# Patient Record
Sex: MALE | Race: WHITE | NOT HISPANIC OR LATINO | Employment: OTHER | URBAN - METROPOLITAN AREA
[De-identification: names, ages, dates, MRNs, and addresses within clinical notes are randomized per-mention and may not be internally consistent; named-entity substitution may affect disease eponyms.]

---

## 2017-09-18 ENCOUNTER — TELEPHONE (OUTPATIENT)
Dept: INTERNAL MEDICINE | Facility: CLINIC | Age: 57
End: 2017-09-18

## 2017-09-18 NOTE — TELEPHONE ENCOUNTER
----- Message from Cuca Dillon sent at 9/18/2017  9:53 AM CDT -----  Contact: self   Patient would like to consult with nurse regarding orders for annual which is scheduled 9/25/17. Please call back at [phone].      Thanks,  Cuca Dillon

## 2017-09-19 DIAGNOSIS — Z00.00 ANNUAL PHYSICAL EXAM: Primary | ICD-10-CM

## 2017-09-20 ENCOUNTER — LAB VISIT (OUTPATIENT)
Dept: LAB | Facility: HOSPITAL | Age: 57
End: 2017-09-20
Attending: FAMILY MEDICINE
Payer: COMMERCIAL

## 2017-09-20 DIAGNOSIS — Z00.00 ANNUAL PHYSICAL EXAM: ICD-10-CM

## 2017-09-20 DIAGNOSIS — Z11.59 NEED FOR HEPATITIS C SCREENING TEST: ICD-10-CM

## 2017-09-20 LAB
ALBUMIN SERPL BCP-MCNC: 4.5 G/DL
ALP SERPL-CCNC: 41 U/L
ALT SERPL W/O P-5'-P-CCNC: 46 U/L
ANION GAP SERPL CALC-SCNC: 11 MMOL/L
AST SERPL-CCNC: 49 U/L
BASOPHILS # BLD AUTO: 0.06 K/UL
BASOPHILS NFR BLD: 1.2 %
BILIRUB SERPL-MCNC: 1.2 MG/DL
BUN SERPL-MCNC: 19 MG/DL
CALCIUM SERPL-MCNC: 10.4 MG/DL
CHLORIDE SERPL-SCNC: 101 MMOL/L
CHOLEST SERPL-MCNC: 233 MG/DL
CHOLEST/HDLC SERPL: 6.1 {RATIO}
CO2 SERPL-SCNC: 30 MMOL/L
COMPLEXED PSA SERPL-MCNC: 1.6 NG/ML
CREAT SERPL-MCNC: 1.6 MG/DL
DIFFERENTIAL METHOD: ABNORMAL
EOSINOPHIL # BLD AUTO: 0.1 K/UL
EOSINOPHIL NFR BLD: 1.8 %
ERYTHROCYTE [DISTWIDTH] IN BLOOD BY AUTOMATED COUNT: 13 %
EST. GFR  (AFRICAN AMERICAN): 54.5 ML/MIN/1.73 M^2
EST. GFR  (NON AFRICAN AMERICAN): 47.1 ML/MIN/1.73 M^2
GLUCOSE SERPL-MCNC: 96 MG/DL
HCT VFR BLD AUTO: 40.8 %
HDLC SERPL-MCNC: 38 MG/DL
HDLC SERPL: 16.3 %
HGB BLD-MCNC: 14.5 G/DL
LDLC SERPL CALC-MCNC: 136.8 MG/DL
LYMPHOCYTES # BLD AUTO: 2 K/UL
LYMPHOCYTES NFR BLD: 39.3 %
MCH RBC QN AUTO: 31.3 PG
MCHC RBC AUTO-ENTMCNC: 35.5 G/DL
MCV RBC AUTO: 88 FL
MONOCYTES # BLD AUTO: 0.5 K/UL
MONOCYTES NFR BLD: 10.2 %
NEUTROPHILS # BLD AUTO: 2.4 K/UL
NEUTROPHILS NFR BLD: 47.3 %
NONHDLC SERPL-MCNC: 195 MG/DL
PLATELET # BLD AUTO: 259 K/UL
PMV BLD AUTO: 12 FL
POTASSIUM SERPL-SCNC: 3.5 MMOL/L
PROT SERPL-MCNC: 7.7 G/DL
RBC # BLD AUTO: 4.64 M/UL
SODIUM SERPL-SCNC: 142 MMOL/L
TRIGL SERPL-MCNC: 291 MG/DL
WBC # BLD AUTO: 5.01 K/UL

## 2017-09-20 PROCEDURE — 80061 LIPID PANEL: CPT

## 2017-09-20 PROCEDURE — 36415 COLL VENOUS BLD VENIPUNCTURE: CPT | Mod: PO

## 2017-09-20 PROCEDURE — 84153 ASSAY OF PSA TOTAL: CPT

## 2017-09-20 PROCEDURE — 80053 COMPREHEN METABOLIC PANEL: CPT

## 2017-09-20 PROCEDURE — 85025 COMPLETE CBC W/AUTO DIFF WBC: CPT

## 2017-09-20 PROCEDURE — 86803 HEPATITIS C AB TEST: CPT

## 2017-09-21 LAB — HCV AB SERPL QL IA: NEGATIVE

## 2017-09-25 ENCOUNTER — OFFICE VISIT (OUTPATIENT)
Dept: INTERNAL MEDICINE | Facility: CLINIC | Age: 57
End: 2017-09-25
Payer: COMMERCIAL

## 2017-09-25 VITALS
WEIGHT: 214.5 LBS | SYSTOLIC BLOOD PRESSURE: 124 MMHG | DIASTOLIC BLOOD PRESSURE: 76 MMHG | HEIGHT: 71 IN | BODY MASS INDEX: 30.03 KG/M2 | HEART RATE: 82 BPM | TEMPERATURE: 96 F

## 2017-09-25 DIAGNOSIS — Z12.11 ENCOUNTER FOR SCREENING COLONOSCOPY: ICD-10-CM

## 2017-09-25 DIAGNOSIS — I10 ESSENTIAL HYPERTENSION: Primary | ICD-10-CM

## 2017-09-25 DIAGNOSIS — E78.5 HYPERLIPIDEMIA, UNSPECIFIED HYPERLIPIDEMIA TYPE: ICD-10-CM

## 2017-09-25 DIAGNOSIS — Z00.00 ANNUAL PHYSICAL EXAM: ICD-10-CM

## 2017-09-25 PROCEDURE — 3074F SYST BP LT 130 MM HG: CPT | Mod: S$GLB,,, | Performed by: FAMILY MEDICINE

## 2017-09-25 PROCEDURE — 99396 PREV VISIT EST AGE 40-64: CPT | Mod: S$GLB,,, | Performed by: FAMILY MEDICINE

## 2017-09-25 PROCEDURE — 3078F DIAST BP <80 MM HG: CPT | Mod: S$GLB,,, | Performed by: FAMILY MEDICINE

## 2017-09-25 PROCEDURE — 99999 PR PBB SHADOW E&M-EST. PATIENT-LVL III: CPT | Mod: PBBFAC,,, | Performed by: FAMILY MEDICINE

## 2017-09-25 PROCEDURE — 3008F BODY MASS INDEX DOCD: CPT | Mod: S$GLB,,, | Performed by: FAMILY MEDICINE

## 2017-09-25 RX ORDER — OMEPRAZOLE 20 MG/1
20 CAPSULE, DELAYED RELEASE ORAL DAILY
COMMUNITY
End: 2024-03-12 | Stop reason: SDUPTHER

## 2017-09-25 RX ORDER — AMLODIPINE, VALSARTAN AND HYDROCHLOROTHIAZIDE 10; 320; 25 MG/1; MG/1; MG/1
1 TABLET ORAL DAILY
Qty: 90 TABLET | Refills: 3 | Status: SHIPPED | OUTPATIENT
Start: 2017-09-25 | End: 2018-11-15 | Stop reason: SDUPTHER

## 2017-09-25 RX ORDER — FENOFIBRATE 54 MG/1
54 TABLET ORAL DAILY
Qty: 90 TABLET | Refills: 3 | Status: SHIPPED | OUTPATIENT
Start: 2017-09-25 | End: 2018-11-15 | Stop reason: SDUPTHER

## 2017-09-25 NOTE — PROGRESS NOTES
Subjective:       Patient ID: Darek Leal is a 57 y.o. male.    Chief Complaint: Follow-up    F/U:      Pt is a 57 year old who is here for follow-up. Pt BP is well controlled and review of his labs. Pt cholesterol is elevated from last labs.       Review of Systems   Constitutional: Negative.    Respiratory: Negative.    Cardiovascular: Negative.    Gastrointestinal: Negative.    Genitourinary: Negative.    Musculoskeletal: Negative.    Neurological: Negative.    Hematological: Negative.    Psychiatric/Behavioral: Negative.        Objective:      Physical Exam   Constitutional: He is oriented to person, place, and time. He appears well-developed and well-nourished.   Cardiovascular: Normal rate and regular rhythm.    Pulmonary/Chest: Effort normal and breath sounds normal.   Abdominal: Soft. Bowel sounds are normal.   Neurological: He is alert and oriented to person, place, and time.       Assessment:       1. Essential hypertension    2. Hyperlipidemia, unspecified hyperlipidemia type    3. Encounter for screening colonoscopy        Plan:       Essential hypertension  Comments:  BP is stable and controlled  Orders:  -     Basic metabolic panel; Future; Expected date: 11/08/2017    Hyperlipidemia, unspecified hyperlipidemia type  Comments:  Will recheck in 4 months  Orders:  -     Lipid panel; Future; Expected date: 02/05/2018    Encounter for screening colonoscopy  Comments:  Will schedule for colonsocopy  Orders:  -     Case request GI: COLONOSCOPY    Other orders  -     amlodipine-valsartan-hcthiazid (EXFORGE HCT) -25 mg Tab; Take 1 tablet by mouth once daily.  Dispense: 90 tablet; Refill: 3  -     fenofibrate (TRICOR) 54 MG tablet; Take 1 tablet (54 mg total) by mouth once daily.  Dispense: 90 tablet; Refill: 3

## 2017-10-18 RX ORDER — SODIUM, POTASSIUM,MAG SULFATES 17.5-3.13G
SOLUTION, RECONSTITUTED, ORAL ORAL
Qty: 354 ML | Refills: 0 | Status: ON HOLD | OUTPATIENT
Start: 2017-10-18 | End: 2017-11-07 | Stop reason: HOSPADM

## 2017-11-07 ENCOUNTER — ANESTHESIA (OUTPATIENT)
Dept: ENDOSCOPY | Facility: HOSPITAL | Age: 57
End: 2017-11-07
Payer: COMMERCIAL

## 2017-11-07 ENCOUNTER — HOSPITAL ENCOUNTER (OUTPATIENT)
Facility: HOSPITAL | Age: 57
Discharge: HOME | End: 2017-11-07
Attending: FAMILY MEDICINE | Admitting: FAMILY MEDICINE
Payer: COMMERCIAL

## 2017-11-07 ENCOUNTER — SURGERY (OUTPATIENT)
Age: 57
End: 2017-11-07

## 2017-11-07 ENCOUNTER — ANESTHESIA EVENT (OUTPATIENT)
Dept: ENDOSCOPY | Facility: HOSPITAL | Age: 57
End: 2017-11-07
Payer: COMMERCIAL

## 2017-11-07 VITALS
TEMPERATURE: 98 F | WEIGHT: 211 LBS | HEIGHT: 72 IN | DIASTOLIC BLOOD PRESSURE: 89 MMHG | SYSTOLIC BLOOD PRESSURE: 146 MMHG | HEART RATE: 62 BPM | RESPIRATION RATE: 18 BRPM | BODY MASS INDEX: 28.58 KG/M2 | OXYGEN SATURATION: 96 %

## 2017-11-07 VITALS — RESPIRATION RATE: 15 BRPM

## 2017-11-07 DIAGNOSIS — K63.5 POLYP OF COLON, UNSPECIFIED PART OF COLON, UNSPECIFIED TYPE: ICD-10-CM

## 2017-11-07 DIAGNOSIS — Z12.11 ENCOUNTER FOR SCREENING COLONOSCOPY: Primary | ICD-10-CM

## 2017-11-07 DIAGNOSIS — Z12.11 SPECIAL SCREENING FOR MALIGNANT NEOPLASMS, COLON: ICD-10-CM

## 2017-11-07 PROCEDURE — 25000003 PHARM REV CODE 250: Performed by: FAMILY MEDICINE

## 2017-11-07 PROCEDURE — 27201012 HC FORCEPS, HOT/COLD, DISP: Performed by: FAMILY MEDICINE

## 2017-11-07 PROCEDURE — 45385 COLONOSCOPY W/LESION REMOVAL: CPT | Performed by: FAMILY MEDICINE

## 2017-11-07 PROCEDURE — 45380 COLONOSCOPY AND BIOPSY: CPT | Mod: 59,,, | Performed by: FAMILY MEDICINE

## 2017-11-07 PROCEDURE — 88305 TISSUE EXAM BY PATHOLOGIST: CPT | Mod: 26,,, | Performed by: PATHOLOGY

## 2017-11-07 PROCEDURE — 27200997: Performed by: FAMILY MEDICINE

## 2017-11-07 PROCEDURE — 27201089 HC SNARE, DISP (ANY): Performed by: FAMILY MEDICINE

## 2017-11-07 PROCEDURE — 25000003 PHARM REV CODE 250: Performed by: NURSE ANESTHETIST, CERTIFIED REGISTERED

## 2017-11-07 PROCEDURE — 37000008 HC ANESTHESIA 1ST 15 MINUTES: Performed by: FAMILY MEDICINE

## 2017-11-07 PROCEDURE — 88305 TISSUE EXAM BY PATHOLOGIST: CPT | Performed by: PATHOLOGY

## 2017-11-07 PROCEDURE — 63600175 PHARM REV CODE 636 W HCPCS: Performed by: NURSE ANESTHETIST, CERTIFIED REGISTERED

## 2017-11-07 PROCEDURE — 37000009 HC ANESTHESIA EA ADD 15 MINS: Performed by: FAMILY MEDICINE

## 2017-11-07 PROCEDURE — 45385 COLONOSCOPY W/LESION REMOVAL: CPT | Mod: 33,,, | Performed by: FAMILY MEDICINE

## 2017-11-07 PROCEDURE — 45380 COLONOSCOPY AND BIOPSY: CPT | Performed by: FAMILY MEDICINE

## 2017-11-07 RX ORDER — LIDOCAINE HYDROCHLORIDE 10 MG/ML
INJECTION INFILTRATION; PERINEURAL
Status: DISCONTINUED | OUTPATIENT
Start: 2017-11-07 | End: 2017-11-07

## 2017-11-07 RX ORDER — PROPOFOL 10 MG/ML
VIAL (ML) INTRAVENOUS
Status: DISCONTINUED | OUTPATIENT
Start: 2017-11-07 | End: 2017-11-07

## 2017-11-07 RX ORDER — SODIUM CHLORIDE, SODIUM LACTATE, POTASSIUM CHLORIDE, CALCIUM CHLORIDE 600; 310; 30; 20 MG/100ML; MG/100ML; MG/100ML; MG/100ML
INJECTION, SOLUTION INTRAVENOUS CONTINUOUS
Status: DISCONTINUED | OUTPATIENT
Start: 2017-11-08 | End: 2017-11-07 | Stop reason: HOSPADM

## 2017-11-07 RX ADMIN — PROPOFOL 70 MG: 10 INJECTION, EMULSION INTRAVENOUS at 09:11

## 2017-11-07 RX ADMIN — PROPOFOL 30 MG: 10 INJECTION, EMULSION INTRAVENOUS at 09:11

## 2017-11-07 RX ADMIN — PROPOFOL 20 MG: 10 INJECTION, EMULSION INTRAVENOUS at 09:11

## 2017-11-07 RX ADMIN — PROPOFOL 50 MG: 10 INJECTION, EMULSION INTRAVENOUS at 09:11

## 2017-11-07 RX ADMIN — LIDOCAINE HYDROCHLORIDE 50 MG: 10 INJECTION, SOLUTION INFILTRATION; PERINEURAL at 09:11

## 2017-11-07 RX ADMIN — SODIUM CHLORIDE, SODIUM LACTATE, POTASSIUM CHLORIDE, AND CALCIUM CHLORIDE: .6; .31; .03; .02 INJECTION, SOLUTION INTRAVENOUS at 09:11

## 2017-11-07 NOTE — ANESTHESIA PREPROCEDURE EVALUATION
11/07/2017  Darek Leal is a 57 y.o., male.    Anesthesia Evaluation    I have reviewed the Patient Summary Reports.    I have reviewed the Nursing Notes.   I have reviewed the Medications.     Review of Systems  Anesthesia Hx:  No problems with previous Anesthesia  Denies Family Hx of Anesthesia complications.   Denies Personal Hx of Anesthesia complications.   Social:  No Alcohol Use, Non-Smoker    Hematology/Oncology:  Hematology Normal   Oncology Normal     Cardiovascular:   Hypertension Denies MI.   Denies CABG/stent.      hyperlipidemia    Pulmonary:   Denies COPD.  Denies Asthma.  Denies Sleep Apnea.    Renal/:  Renal/ Normal     Hepatic/GI:   Bowel Prep. GERD Denies Liver Disease. Denies Hepatitis.    Musculoskeletal:   Gout   Neurological:   Denies CVA. Denies Seizures.    Endocrine:  Endocrine Normal        Physical Exam  General:  Obesity    Airway/Jaw/Neck:  Airway Findings: Mouth Opening: Normal Tongue: Normal  General Airway Assessment: Adult  Mallampati: II      Dental:  Dental Findings: In tact   Chest/Lungs:  Chest/Lungs Findings: Clear to auscultation, Normal Respiratory Rate     Heart/Vascular:  Heart Findings: Rate: Normal  Rhythm: Regular Rhythm  Sounds: Normal             Anesthesia Plan  Type of Anesthesia, risks & benefits discussed:  Anesthesia Type:  MAC  Patient's Preference:   Intra-op Monitoring Plan: standard ASA monitors  Intra-op Monitoring Plan Comments:   Post Op Pain Control Plan:   Post Op Pain Control Plan Comments:   Induction:   IV  Beta Blocker:  Patient is not currently on a Beta-Blocker (No further documentation required).       Informed Consent: Patient understands risks and agrees with Anesthesia plan.  Questions answered. Anesthesia consent signed with patient.  ASA Score: 2     Day of Surgery Review of History & Physical: I have interviewed and examined  the patient. I have reviewed the patient's H&P dated:  There are no significant changes.  H&P update referred to the surgeon.         Ready For Surgery From Anesthesia Perspective.

## 2017-11-07 NOTE — H&P
Short Stay Endoscopy History and Physical    PCP - Td Dunn MD    Procedure - Colonoscopy  ASA - 2  Mallampati - per anesthesia  History of Anesthesia problems - no  Family history Anesthesia problems -  no     HPI:  This is a 57 y.o. male here for evaluation of :   Active Hospital Problems    Diagnosis  POA    Special screening for malignant neoplasms, colon [Z12.11]  Not Applicable      Resolved Hospital Problems    Diagnosis Date Resolved POA   No resolved problems to display.         Health Maintenance       Date Due Completion Date    TETANUS VACCINE 01/01/1978 ---    Colonoscopy 01/01/2010 ---    Influenza Vaccine 08/01/2017 ---    PROSTATE-SPECIFIC ANTIGEN 09/20/2018 9/20/2017    Override on 1/11/2013: Done    Lipid Panel 09/20/2018 9/20/2017          Screening - yes-this is his first.  History of polyps - no  Diarrhea - no  Anemia - no  Blood in stools - no  Abdominal pain - no  Other - no    ROS:  CONSTITUTIONAL: Denies weight change,  fatigue, fevers, chills, night sweats.  CARDIOVASCULAR: Denies chest pain, shortness of breath, orthopnea and edema.  RESPIRATORY: Denies cough, hemoptysis, dyspnea, and wheezing.  GI: See HPI.    Medical History:   Past Medical History:   Diagnosis Date    Gout attack     Hypertension        Surgical History:   Past Surgical History:   Procedure Laterality Date    ANKLE SURGERY      GANGLION CYST EXCISION      VASECTOMY         Family History:   Family History   Problem Relation Age of Onset    Hypertension Mother        Social History:   Social History   Substance Use Topics    Smoking status: Never Smoker    Smokeless tobacco: Never Used    Alcohol use No       Allergies:   Review of patient's allergies indicates:  No Known Allergies    Medications:   No current facility-administered medications on file prior to encounter.      Current Outpatient Prescriptions on File Prior to Encounter   Medication Sig Dispense Refill    amlodipine-valsartan-hcthiazid  (EXFORGE HCT) -25 mg Tab Take 1 tablet by mouth once daily. 90 tablet 3    fenofibrate (TRICOR) 54 MG tablet Take 1 tablet (54 mg total) by mouth once daily. 90 tablet 3    omeprazole (PRILOSEC) 20 MG capsule Take 20 mg by mouth once daily.      sodium,potassium,&mag sulfates (SUPREP) 17.5-3.13-1.6 gram SolR Use as directed. 354 mL 0       Physical Exam:  Vital Signs: see nurses notes.  General Appearance: Well appearing in no acute distress  ENT: OP clear  Chest: CTA B  CV: RRR, no m/r/g  Abd: s/nt/nd/nabs  Ext: no edema    Labs:Reviewed    IMP:  Active Hospital Problems    Diagnosis  POA    Special screening for malignant neoplasms, colon [Z12.11]  Not Applicable      Resolved Hospital Problems    Diagnosis Date Resolved POA   No resolved problems to display.         Plan:   I have explained the risks and benefits of colonoscopy to the patient including but not limited to bleeding, perforation, infection, and death. The patient wishes to proceed.

## 2017-11-07 NOTE — ANESTHESIA RELEASE NOTE
"Anesthesia Release from PACU Note    Patient: Darek Leal    Procedure(s) Performed: Procedure(s) (LRB):  COLONOSCOPY (N/A)    Anesthesia type: MAC    Post pain: Adequate analgesia    Post assessment: no apparent anesthetic complications, tolerated procedure well and no evidence of recall    Last Vitals:   Visit Vitals  BP (!) 169/97 (BP Location: Left arm, Patient Position: Lying)   Pulse 85   Temp 36.7 °C (98.1 °F) (Oral)   Resp 18   Ht 5' 11.5" (1.816 m)   Wt 95.7 kg (211 lb)   SpO2 97%   BMI 29.02 kg/m²       Post vital signs: stable    Level of consciousness: awake    Nausea/Vomiting: no nausea/no vomiting    Complications: none    Airway Patency: patent    Respiratory: unassisted, spontaneous ventilation, room air    Cardiovascular: stable and blood pressure at baseline    Hydration: euvolemic  "

## 2017-11-07 NOTE — ANESTHESIA POSTPROCEDURE EVALUATION
"Anesthesia Post Evaluation    Patient: Darek Leal    Procedure(s) Performed: Procedure(s) (LRB):  COLONOSCOPY (N/A)    Final Anesthesia Type: MAC  Patient location during evaluation: PACU  Patient participation: Yes- Able to Participate  Level of consciousness: awake  Post-procedure vital signs: reviewed and stable  Pain management: adequate  Airway patency: patent  PONV status at discharge: No PONV  Anesthetic complications: no      Cardiovascular status: blood pressure returned to baseline and hemodynamically stable  Respiratory status: unassisted, spontaneous ventilation and room air  Hydration status: euvolemic  Follow-up not needed.        Visit Vitals  BP (!) 169/97 (BP Location: Left arm, Patient Position: Lying)   Pulse 85   Temp 36.7 °C (98.1 °F) (Oral)   Resp 18   Ht 5' 11.5" (1.816 m)   Wt 95.7 kg (211 lb)   SpO2 97%   BMI 29.02 kg/m²       Pain/Demetrius Score: Pain Assessment Performed: Yes (11/7/2017  8:56 AM)  Presence of Pain: denies (11/7/2017  8:56 AM)      "

## 2017-11-07 NOTE — TRANSFER OF CARE
"Anesthesia Transfer of Care Note    Patient: Darek Leal    Procedure(s) Performed: Procedure(s) (LRB):  COLONOSCOPY (N/A)    Patient location: PACU    Anesthesia Type: MAC    Transport from OR: Transported from OR on room air with adequate spontaneous ventilation    Post pain: adequate analgesia    Post assessment: no apparent anesthetic complications and tolerated procedure well    Post vital signs: stable    Level of consciousness: awake    Nausea/Vomiting: no nausea/vomiting    Complications: none    Transfer of care protocol was followed      Last vitals:   Visit Vitals  BP (!) 169/97 (BP Location: Left arm, Patient Position: Lying)   Pulse 85   Temp 36.7 °C (98.1 °F) (Oral)   Resp 18   Ht 5' 11.5" (1.816 m)   Wt 95.7 kg (211 lb)   SpO2 97%   BMI 29.02 kg/m²     "

## 2017-11-07 NOTE — DISCHARGE INSTRUCTIONS

## 2017-11-07 NOTE — DISCHARGE SUMMARY
Endoscopy Discharge Summary      Admit Date: 11/7/2017    Discharge Date and Time:  11/7/2017 9:56 AM    Attending Physician: Philipp Chen MD     Discharge Physician: Philipp Chen MD     Principal Admitting Diagnoses: Special screening for malignant neoplasms, colon         Discharge Diagnosis: The primary encounter diagnosis was Encounter for screening colonoscopy. Diagnoses of Special screening for malignant neoplasms, colon and Polyp of colon, unspecified part of colon, unspecified type were also pertinent to this visit.     Discharged Condition: Good    Indication for Admission: Special screening for malignant neoplasms, colon     Hospital Course: Patient was admitted for an inpatient procedure and tolerated the procedure well with no complications.    Significant Diagnostic Studies: hemoclip placement x 2, Colonoscopy with cold biopsy polypectomy and Colonoscopy with hot snare polypectomy    Pathology (if any):  Specimen (12h ago through future)    Start     Ordered    11/07/17 0945  Specimen to Pathology - Surgery  Once     Comments:  1.  Transverse colon polyp2.  Distal descending colon polyp3.  Sigmiod colon polyp      11/07/17 0951          Estimated Blood Loss: 0 ml.    Discussed with: patient and family.    Disposition: Home.    Follow Up/Patient Instructions:   Current Discharge Medication List      CONTINUE these medications which have NOT CHANGED    Details   amlodipine-valsartan-hcthiazid (EXFORGE HCT) -25 mg Tab Take 1 tablet by mouth once daily.  Qty: 90 tablet, Refills: 3      fenofibrate (TRICOR) 54 MG tablet Take 1 tablet (54 mg total) by mouth once daily.  Qty: 90 tablet, Refills: 3      omeprazole (PRILOSEC) 20 MG capsule Take 20 mg by mouth once daily.         STOP taking these medications       sodium,potassium,&mag sulfates (SUPREP) 17.5-3.13-1.6 gram SolR Comments:   Reason for Stopping:         sodium,potassium,mag sulfates (SUPREP BOWEL PREP KIT) 17.5-3.13-1.6 gram SolR  Comments:   Reason for Stopping:                 Discharge Procedure Orders  Diet general     Activity as tolerated     Call MD for:  temperature >100.4     Call MD for:  persistent nausea and vomiting     Call MD for:  severe uncontrolled pain     Call MD for:  difficulty breathing, headache or visual disturbances     Call MD for:  redness, tenderness, or signs of infection (pain, swelling, redness, odor or green/yellow discharge around incision site)     Call MD for:  hives     Call MD for:  persistent dizziness or light-headedness     No dressing needed         Follow-up Information     Philipp Chen MD. Call in 1 week.    Specialty:  Family Medicine  Why:  To receive pathology results.  Contact information:  [address] [facility] [address] [phone]                   @[email or web address]([phone])@

## 2017-11-08 ENCOUNTER — LAB VISIT (OUTPATIENT)
Dept: LAB | Facility: HOSPITAL | Age: 57
End: 2017-11-08
Attending: FAMILY MEDICINE
Payer: COMMERCIAL

## 2017-11-08 DIAGNOSIS — I10 ESSENTIAL HYPERTENSION: ICD-10-CM

## 2017-11-08 DIAGNOSIS — E78.5 HYPERLIPIDEMIA, UNSPECIFIED HYPERLIPIDEMIA TYPE: ICD-10-CM

## 2017-11-08 LAB
ANION GAP SERPL CALC-SCNC: 10 MMOL/L
BUN SERPL-MCNC: 21 MG/DL
CALCIUM SERPL-MCNC: 9.9 MG/DL
CHLORIDE SERPL-SCNC: 104 MMOL/L
CHOLEST SERPL-MCNC: 225 MG/DL
CHOLEST/HDLC SERPL: 6.1 {RATIO}
CO2 SERPL-SCNC: 28 MMOL/L
CREAT SERPL-MCNC: 1.6 MG/DL
EST. GFR  (AFRICAN AMERICAN): 54.5 ML/MIN/1.73 M^2
EST. GFR  (NON AFRICAN AMERICAN): 47.1 ML/MIN/1.73 M^2
GLUCOSE SERPL-MCNC: 86 MG/DL
HDLC SERPL-MCNC: 37 MG/DL
HDLC SERPL: 16.4 %
LDLC SERPL CALC-MCNC: 124.2 MG/DL
NONHDLC SERPL-MCNC: 188 MG/DL
POTASSIUM SERPL-SCNC: 3.9 MMOL/L
SODIUM SERPL-SCNC: 142 MMOL/L
TRIGL SERPL-MCNC: 319 MG/DL

## 2017-11-08 PROCEDURE — 80048 BASIC METABOLIC PNL TOTAL CA: CPT

## 2017-11-08 PROCEDURE — 36415 COLL VENOUS BLD VENIPUNCTURE: CPT | Mod: PO

## 2017-11-08 PROCEDURE — 80061 LIPID PANEL: CPT

## 2017-11-11 NOTE — PROGRESS NOTES
Dear Td Dunn MD,    I recently cared for Darek Leal and performed an endoscopy.  Tissue was sent for pathology evaluation and I will have a letter written to ask the patient to repeat the colonoscopy in 5 years.  The pathology showed that there was adenomatous tissue present.  Thank you for allowing me to participate in the care of your patient.  Please call me for any questions that you might have.      Dr. Philipp Chen  [phone] cell  [phone] office      NURSING STAFF:Please  inform the patient that I reviewed the recent pathology obtained at the time of colonoscopy.    The results showed that there was adenomatous tissue present which is benign and based on that, I recommend that the patient have a repeat colonoscopy performed in 5 years.     If the patient has MyChart, this message has been sent to them.  Confirm that they read the note.  If not, copy the information and print a letter to send to the patient at this time.  Confirm that a notation to the PCP was done.      Dear Darek Leal,    This is to inform you that I have reviewed your recent colonoscopy pathology.  The results showed that you had adenomatous tissue present which is benign and based on that, I recommend that you have a repeat colonoscopy performed in 5 years.      Dr. Philipp Chen  [phone]

## 2017-11-22 ENCOUNTER — TELEPHONE (OUTPATIENT)
Dept: INTERNAL MEDICINE | Facility: CLINIC | Age: 57
End: 2017-11-22

## 2017-11-22 NOTE — TELEPHONE ENCOUNTER
----- Message from Eladia Marie sent at 11/22/2017 11:46 AM CST -----  Contact: Pt/Wife  Please give pt wife a call at [phone] regarding an appt that was documented incorrectly.

## 2017-11-22 NOTE — TELEPHONE ENCOUNTER
Patient's appointment for 09/25 should be annual visit.  Please change or update record and resubmit, per [employer]

## 2017-12-18 ENCOUNTER — OFFICE VISIT (OUTPATIENT)
Dept: INTERNAL MEDICINE | Facility: CLINIC | Age: 57
End: 2017-12-18
Payer: COMMERCIAL

## 2017-12-18 VITALS
HEART RATE: 92 BPM | WEIGHT: 217.81 LBS | DIASTOLIC BLOOD PRESSURE: 89 MMHG | TEMPERATURE: 97 F | SYSTOLIC BLOOD PRESSURE: 138 MMHG | HEIGHT: 72 IN | BODY MASS INDEX: 29.5 KG/M2

## 2017-12-18 DIAGNOSIS — R05.9 COUGH: ICD-10-CM

## 2017-12-18 DIAGNOSIS — J32.0 MAXILLARY SINUSITIS, UNSPECIFIED CHRONICITY: Primary | ICD-10-CM

## 2017-12-18 PROCEDURE — 99999 PR PBB SHADOW E&M-EST. PATIENT-LVL III: CPT | Mod: PBBFAC,,, | Performed by: FAMILY MEDICINE

## 2017-12-18 PROCEDURE — 96372 THER/PROPH/DIAG INJ SC/IM: CPT | Mod: S$GLB,,, | Performed by: FAMILY MEDICINE

## 2017-12-18 PROCEDURE — 99214 OFFICE O/P EST MOD 30 MIN: CPT | Mod: 25,S$GLB,, | Performed by: FAMILY MEDICINE

## 2017-12-18 RX ORDER — TRIAMCINOLONE ACETONIDE 40 MG/ML
40 INJECTION, SUSPENSION INTRA-ARTICULAR; INTRAMUSCULAR
Status: COMPLETED | OUTPATIENT
Start: 2017-12-18 | End: 2017-12-18

## 2017-12-18 RX ORDER — AMOXICILLIN AND CLAVULANATE POTASSIUM 500; 125 MG/1; MG/1
1 TABLET, FILM COATED ORAL 2 TIMES DAILY
Qty: 20 TABLET | Refills: 0 | Status: SHIPPED | OUTPATIENT
Start: 2017-12-18 | End: 2018-11-15

## 2017-12-18 RX ORDER — PROMETHAZINE HYDROCHLORIDE AND DEXTROMETHORPHAN HYDROBROMIDE 6.25; 15 MG/5ML; MG/5ML
5 SYRUP ORAL 2 TIMES DAILY PRN
Qty: 100 ML | Refills: 0 | Status: SHIPPED | OUTPATIENT
Start: 2017-12-18 | End: 2017-12-28

## 2017-12-18 RX ADMIN — TRIAMCINOLONE ACETONIDE 40 MG: 40 INJECTION, SUSPENSION INTRA-ARTICULAR; INTRAMUSCULAR at 11:12

## 2017-12-18 NOTE — PROGRESS NOTES
Subjective:       Patient ID: Darek Leal is a 57 y.o. male.    Chief Complaint: Sinus Problem and Cough    Pt is a 57 year old who has been for about 1 days sinus congestion with cough worse at night      Sinus Problem   This is a new problem. The current episode started yesterday. The problem is unchanged. There has been no fever. Associated symptoms include coughing and sinus pressure. Pertinent negatives include no chills, hoarse voice, shortness of breath or sneezing. Past treatments include nothing. The treatment provided mild relief.   Cough   This is a new problem. The current episode started today. The problem has been unchanged. The cough is productive of sputum. Associated symptoms include nasal congestion and postnasal drip. Pertinent negatives include no chills or shortness of breath. Nothing aggravates the symptoms. He has tried nothing for the symptoms. There is no history of asthma, COPD, emphysema or pneumonia.     Review of Systems   Constitutional: Negative for chills.   HENT: Positive for postnasal drip, sinus pain and sinus pressure. Negative for hoarse voice and sneezing.    Respiratory: Positive for cough. Negative for shortness of breath.    Hematological: Negative.    Psychiatric/Behavioral: Negative.        Objective:      Physical Exam   Constitutional: He is oriented to person, place, and time. He appears well-developed and well-nourished.   HENT:   Right Ear: Tympanic membrane is erythematous. A middle ear effusion is present.   Left Ear: Tympanic membrane is erythematous. A middle ear effusion is present.   Nose: Mucosal edema and rhinorrhea present. Right sinus exhibits maxillary sinus tenderness. Left sinus exhibits maxillary sinus tenderness.   Mouth/Throat: Posterior oropharyngeal erythema present. No tonsillar exudate.   Cardiovascular: Normal rate and regular rhythm.  Exam reveals no gallop.    No murmur heard.  Pulmonary/Chest: Effort normal and breath sounds normal. No  respiratory distress. He has no wheezes.   Neurological: He is alert and oriented to person, place, and time.       Assessment:       1. Maxillary sinusitis, unspecified chronicity    2. Cough        Plan:       Maxillary sinusitis, unspecified chronicity  Comments:  Will do kenalog 40 mg IM and Augmentin if not better in 4 days    Cough  Comments:  phenergan DM    Other orders  -     triamcinolone acetonide injection 40 mg; Inject 1 mL (40 mg total) into the muscle one time.  -     promethazine-dextromethorphan (PROMETHAZINE-DM) 6.25-15 mg/5 mL Syrp; Take 5 mLs by mouth 2 (two) times daily as needed.  Dispense: 100 mL; Refill: 0  -     amoxicillin-clavulanate 500-125mg (AUGMENTIN) 500-125 mg Tab; Take 1 tablet (500 mg total) by mouth 2 (two) times daily.  Dispense: 20 tablet; Refill: 0

## 2018-11-15 ENCOUNTER — LAB VISIT (OUTPATIENT)
Dept: LAB | Facility: HOSPITAL | Age: 58
End: 2018-11-15
Attending: FAMILY MEDICINE
Payer: COMMERCIAL

## 2018-11-15 ENCOUNTER — OFFICE VISIT (OUTPATIENT)
Dept: INTERNAL MEDICINE | Facility: CLINIC | Age: 58
End: 2018-11-15
Payer: COMMERCIAL

## 2018-11-15 VITALS
HEART RATE: 99 BPM | BODY MASS INDEX: 28.58 KG/M2 | DIASTOLIC BLOOD PRESSURE: 100 MMHG | HEIGHT: 72 IN | WEIGHT: 211 LBS | TEMPERATURE: 97 F | SYSTOLIC BLOOD PRESSURE: 142 MMHG

## 2018-11-15 DIAGNOSIS — Z00.00 ANNUAL PHYSICAL EXAM: Primary | ICD-10-CM

## 2018-11-15 DIAGNOSIS — M10.9 GOUT, UNSPECIFIED CAUSE, UNSPECIFIED CHRONICITY, UNSPECIFIED SITE: ICD-10-CM

## 2018-11-15 DIAGNOSIS — Z00.00 ANNUAL PHYSICAL EXAM: ICD-10-CM

## 2018-11-15 LAB
ALBUMIN SERPL BCP-MCNC: 4.2 G/DL
ALP SERPL-CCNC: 43 U/L
ALT SERPL W/O P-5'-P-CCNC: 26 U/L
ANION GAP SERPL CALC-SCNC: 8 MMOL/L
AST SERPL-CCNC: 28 U/L
BASOPHILS # BLD AUTO: 0.07 K/UL
BASOPHILS NFR BLD: 1 %
BILIRUB SERPL-MCNC: 0.7 MG/DL
BUN SERPL-MCNC: 16 MG/DL
CALCIUM SERPL-MCNC: 9.9 MG/DL
CHLORIDE SERPL-SCNC: 103 MMOL/L
CHOLEST SERPL-MCNC: 223 MG/DL
CHOLEST/HDLC SERPL: 5.4 {RATIO}
CO2 SERPL-SCNC: 31 MMOL/L
COMPLEXED PSA SERPL-MCNC: 1.1 NG/ML
CREAT SERPL-MCNC: 1.2 MG/DL
DIFFERENTIAL METHOD: NORMAL
EOSINOPHIL # BLD AUTO: 0.1 K/UL
EOSINOPHIL NFR BLD: 1 %
ERYTHROCYTE [DISTWIDTH] IN BLOOD BY AUTOMATED COUNT: 12.4 %
EST. GFR  (AFRICAN AMERICAN): >60 ML/MIN/1.73 M^2
EST. GFR  (NON AFRICAN AMERICAN): >60 ML/MIN/1.73 M^2
GLUCOSE SERPL-MCNC: 96 MG/DL
HCT VFR BLD AUTO: 40.2 %
HDLC SERPL-MCNC: 41 MG/DL
HDLC SERPL: 18.4 %
HGB BLD-MCNC: 14.2 G/DL
IMM GRANULOCYTES # BLD AUTO: 0.03 K/UL
IMM GRANULOCYTES NFR BLD AUTO: 0.4 %
LDLC SERPL CALC-MCNC: 125.2 MG/DL
LYMPHOCYTES # BLD AUTO: 1.9 K/UL
LYMPHOCYTES NFR BLD: 28.2 %
MCH RBC QN AUTO: 30.9 PG
MCHC RBC AUTO-ENTMCNC: 35.3 G/DL
MCV RBC AUTO: 87 FL
MONOCYTES # BLD AUTO: 0.6 K/UL
MONOCYTES NFR BLD: 8.9 %
NEUTROPHILS # BLD AUTO: 4.1 K/UL
NEUTROPHILS NFR BLD: 60.5 %
NONHDLC SERPL-MCNC: 182 MG/DL
NRBC BLD-RTO: 0 /100 WBC
PLATELET # BLD AUTO: 266 K/UL
PMV BLD AUTO: 12.8 FL
POTASSIUM SERPL-SCNC: 3.8 MMOL/L
PROT SERPL-MCNC: 7.3 G/DL
RBC # BLD AUTO: 4.6 M/UL
SODIUM SERPL-SCNC: 142 MMOL/L
TRIGL SERPL-MCNC: 284 MG/DL
URATE SERPL-MCNC: 6 MG/DL
WBC # BLD AUTO: 6.84 K/UL

## 2018-11-15 PROCEDURE — 80061 LIPID PANEL: CPT

## 2018-11-15 PROCEDURE — 99999 PR PBB SHADOW E&M-EST. PATIENT-LVL III: CPT | Mod: PBBFAC,,, | Performed by: FAMILY MEDICINE

## 2018-11-15 PROCEDURE — 80053 COMPREHEN METABOLIC PANEL: CPT

## 2018-11-15 PROCEDURE — 85025 COMPLETE CBC W/AUTO DIFF WBC: CPT

## 2018-11-15 PROCEDURE — 36415 COLL VENOUS BLD VENIPUNCTURE: CPT | Mod: PO

## 2018-11-15 PROCEDURE — 99396 PREV VISIT EST AGE 40-64: CPT | Mod: S$GLB,,, | Performed by: FAMILY MEDICINE

## 2018-11-15 PROCEDURE — 84550 ASSAY OF BLOOD/URIC ACID: CPT

## 2018-11-15 PROCEDURE — 84153 ASSAY OF PSA TOTAL: CPT

## 2018-11-15 RX ORDER — AMLODIPINE, VALSARTAN AND HYDROCHLOROTHIAZIDE 10; 320; 25 MG/1; MG/1; MG/1
1 TABLET ORAL DAILY
Qty: 90 TABLET | Refills: 3 | Status: SHIPPED | OUTPATIENT
Start: 2018-11-15 | End: 2020-01-28 | Stop reason: SDUPTHER

## 2018-11-15 RX ORDER — FENOFIBRATE 54 MG/1
54 TABLET ORAL DAILY
Qty: 90 TABLET | Refills: 3 | Status: SHIPPED | OUTPATIENT
Start: 2018-11-15 | End: 2020-01-28 | Stop reason: SDUPTHER

## 2018-11-15 NOTE — PROGRESS NOTES
Subjective:       Patient ID: Darek Leal is a 58 y.o. male.    Chief Complaint: Annual Exam    Annual exam:       Pt is a 58 year old who has HTN and triglycerides.       Review of Systems   Constitutional: Negative.    Respiratory: Negative.    Cardiovascular: Negative.    Genitourinary: Negative.    Neurological: Negative.        Objective:      Physical Exam   Constitutional: He is oriented to person, place, and time. He appears well-developed and well-nourished.   Cardiovascular: Normal rate and regular rhythm. Exam reveals no friction rub.   No murmur heard.  Pulmonary/Chest: Effort normal and breath sounds normal.   Abdominal: Soft. Bowel sounds are normal.   Musculoskeletal: Normal range of motion.   Neurological: He is alert and oriented to person, place, and time.       Assessment:       No diagnosis found.    Plan:       Annual physical exam  Comments:  Will do CBC, CMP and lipid  Orders:  -     CBC auto differential; Future; Expected date: 11/15/2018  -     Comprehensive metabolic panel; Future; Expected date: 11/15/2018  -     Lipid panel; Future; Expected date: 11/15/2018  -     PSA, Screening; Future; Expected date: 11/15/2018    Gout, unspecified cause, unspecified chronicity, unspecified site  Comments:  Will do uric acid  Orders:  -     Uric acid; Future; Expected date: 11/15/2018    Other orders  -     amLODIPine-valsartan-hcthiazid (EXFORGE HCT) -25 mg Tab; Take 1 tablet by mouth once daily.  Dispense: 90 tablet; Refill: 3  -     fenofibrate (TRICOR) 54 MG tablet; Take 1 tablet (54 mg total) by mouth once daily.  Dispense: 90 tablet; Refill: 3

## 2020-01-28 ENCOUNTER — LAB VISIT (OUTPATIENT)
Dept: LAB | Facility: HOSPITAL | Age: 60
End: 2020-01-28
Attending: FAMILY MEDICINE
Payer: COMMERCIAL

## 2020-01-28 ENCOUNTER — OFFICE VISIT (OUTPATIENT)
Dept: INTERNAL MEDICINE | Facility: CLINIC | Age: 60
End: 2020-01-28
Payer: COMMERCIAL

## 2020-01-28 VITALS
HEIGHT: 71 IN | SYSTOLIC BLOOD PRESSURE: 130 MMHG | WEIGHT: 207.88 LBS | DIASTOLIC BLOOD PRESSURE: 82 MMHG | BODY MASS INDEX: 29.1 KG/M2 | TEMPERATURE: 98 F | HEART RATE: 96 BPM

## 2020-01-28 DIAGNOSIS — Z00.00 ANNUAL PHYSICAL EXAM: Primary | ICD-10-CM

## 2020-01-28 DIAGNOSIS — Z00.00 ANNUAL PHYSICAL EXAM: ICD-10-CM

## 2020-01-28 LAB
ALBUMIN SERPL BCP-MCNC: 4.8 G/DL (ref 3.5–5.2)
ALP SERPL-CCNC: 40 U/L (ref 55–135)
ALT SERPL W/O P-5'-P-CCNC: 24 U/L (ref 10–44)
ANION GAP SERPL CALC-SCNC: 12 MMOL/L (ref 8–16)
AST SERPL-CCNC: 25 U/L (ref 10–40)
BASOPHILS # BLD AUTO: 0.08 K/UL (ref 0–0.2)
BASOPHILS NFR BLD: 1.4 % (ref 0–1.9)
BILIRUB SERPL-MCNC: 0.9 MG/DL (ref 0.1–1)
BUN SERPL-MCNC: 18 MG/DL (ref 6–20)
CALCIUM SERPL-MCNC: 10.2 MG/DL (ref 8.7–10.5)
CHLORIDE SERPL-SCNC: 102 MMOL/L (ref 95–110)
CHOLEST SERPL-MCNC: 259 MG/DL (ref 120–199)
CHOLEST/HDLC SERPL: 6 {RATIO} (ref 2–5)
CO2 SERPL-SCNC: 30 MMOL/L (ref 23–29)
COMPLEXED PSA SERPL-MCNC: 1.7 NG/ML (ref 0–4)
CREAT SERPL-MCNC: 1.5 MG/DL (ref 0.5–1.4)
DIFFERENTIAL METHOD: NORMAL
EOSINOPHIL # BLD AUTO: 0.1 K/UL (ref 0–0.5)
EOSINOPHIL NFR BLD: 1.6 % (ref 0–8)
ERYTHROCYTE [DISTWIDTH] IN BLOOD BY AUTOMATED COUNT: 12.8 % (ref 11.5–14.5)
EST. GFR  (AFRICAN AMERICAN): 57.7 ML/MIN/1.73 M^2
EST. GFR  (NON AFRICAN AMERICAN): 49.9 ML/MIN/1.73 M^2
GLUCOSE SERPL-MCNC: 93 MG/DL (ref 70–110)
HCT VFR BLD AUTO: 42.8 % (ref 40–54)
HDLC SERPL-MCNC: 43 MG/DL (ref 40–75)
HDLC SERPL: 16.6 % (ref 20–50)
HGB BLD-MCNC: 14.3 G/DL (ref 14–18)
IMM GRANULOCYTES # BLD AUTO: 0.01 K/UL (ref 0–0.04)
IMM GRANULOCYTES NFR BLD AUTO: 0.2 % (ref 0–0.5)
LDLC SERPL CALC-MCNC: 172.6 MG/DL (ref 63–159)
LYMPHOCYTES # BLD AUTO: 2.4 K/UL (ref 1–4.8)
LYMPHOCYTES NFR BLD: 42.4 % (ref 18–48)
MCH RBC QN AUTO: 30.8 PG (ref 27–31)
MCHC RBC AUTO-ENTMCNC: 33.4 G/DL (ref 32–36)
MCV RBC AUTO: 92 FL (ref 82–98)
MONOCYTES # BLD AUTO: 0.5 K/UL (ref 0.3–1)
MONOCYTES NFR BLD: 9 % (ref 4–15)
NEUTROPHILS # BLD AUTO: 2.5 K/UL (ref 1.8–7.7)
NEUTROPHILS NFR BLD: 45.4 % (ref 38–73)
NONHDLC SERPL-MCNC: 216 MG/DL
NRBC BLD-RTO: 0 /100 WBC
PLATELET # BLD AUTO: 266 K/UL (ref 150–350)
PMV BLD AUTO: 12.8 FL (ref 9.2–12.9)
POTASSIUM SERPL-SCNC: 3.5 MMOL/L (ref 3.5–5.1)
PROT SERPL-MCNC: 7.9 G/DL (ref 6–8.4)
RBC # BLD AUTO: 4.65 M/UL (ref 4.6–6.2)
SODIUM SERPL-SCNC: 144 MMOL/L (ref 136–145)
TRIGL SERPL-MCNC: 217 MG/DL (ref 30–150)
WBC # BLD AUTO: 5.54 K/UL (ref 3.9–12.7)

## 2020-01-28 PROCEDURE — 99396 PR PREVENTIVE VISIT,EST,40-64: ICD-10-PCS | Mod: S$GLB,,, | Performed by: FAMILY MEDICINE

## 2020-01-28 PROCEDURE — 99396 PREV VISIT EST AGE 40-64: CPT | Mod: S$GLB,,, | Performed by: FAMILY MEDICINE

## 2020-01-28 PROCEDURE — 84153 ASSAY OF PSA TOTAL: CPT

## 2020-01-28 PROCEDURE — 99999 PR PBB SHADOW E&M-EST. PATIENT-LVL III: ICD-10-PCS | Mod: PBBFAC,,, | Performed by: FAMILY MEDICINE

## 2020-01-28 PROCEDURE — 80053 COMPREHEN METABOLIC PANEL: CPT

## 2020-01-28 PROCEDURE — 85025 COMPLETE CBC W/AUTO DIFF WBC: CPT

## 2020-01-28 PROCEDURE — 36415 COLL VENOUS BLD VENIPUNCTURE: CPT

## 2020-01-28 PROCEDURE — 80061 LIPID PANEL: CPT

## 2020-01-28 PROCEDURE — 99999 PR PBB SHADOW E&M-EST. PATIENT-LVL III: CPT | Mod: PBBFAC,,, | Performed by: FAMILY MEDICINE

## 2020-01-28 RX ORDER — FENOFIBRATE 54 MG/1
54 TABLET ORAL DAILY
Qty: 90 TABLET | Refills: 3 | Status: SHIPPED | OUTPATIENT
Start: 2020-01-28 | End: 2021-02-09 | Stop reason: SDUPTHER

## 2020-01-28 RX ORDER — AMLODIPINE, VALSARTAN AND HYDROCHLOROTHIAZIDE 10; 320; 25 MG/1; MG/1; MG/1
1 TABLET ORAL DAILY
Qty: 90 TABLET | Refills: 3 | Status: SHIPPED | OUTPATIENT
Start: 2020-01-28 | End: 2021-02-09 | Stop reason: SDUPTHER

## 2020-01-28 NOTE — PROGRESS NOTES
Subjective:       Patient ID: Darek Leal is a 60 y.o. male.    Chief Complaint: Annual Exam    Pt is a 60 year old who is here for annual exam. Pt has HTN that is well controlled. Pt is over all healthy    Review of Systems   Constitutional: Negative.    HENT: Negative.    Respiratory: Negative.    Cardiovascular: Negative.    Gastrointestinal: Negative.    Skin: Negative.    Neurological: Negative.    Psychiatric/Behavioral: Negative.        Objective:      Physical Exam   Constitutional: He is oriented to person, place, and time. He appears well-developed and well-nourished.   HENT:   Head: Normocephalic.   Eyes: Pupils are equal, round, and reactive to light. EOM are normal.   Neck: Normal range of motion. Neck supple. No JVD present. No thyromegaly present.   Cardiovascular: Normal rate and regular rhythm.   Pulmonary/Chest: Effort normal and breath sounds normal.   Abdominal: Soft. Bowel sounds are normal.   Musculoskeletal: Normal range of motion.   Lymphadenopathy:     He has no cervical adenopathy.   Neurological: He is alert and oriented to person, place, and time. He has normal reflexes.   Skin: Skin is warm and dry.   Psychiatric: He has a normal mood and affect. His behavior is normal.       Assessment:       1. Annual physical exam        Plan:       Annual physical exam  -     CBC auto differential; Future; Expected date: 01/28/2020  -     Comprehensive metabolic panel; Future; Expected date: 01/28/2020  -     Lipid panel; Future; Expected date: 01/28/2020  -     PSA, Screening; Future; Expected date: 01/28/2020

## 2021-02-09 ENCOUNTER — OFFICE VISIT (OUTPATIENT)
Dept: INTERNAL MEDICINE | Facility: CLINIC | Age: 61
End: 2021-02-09
Payer: COMMERCIAL

## 2021-02-09 ENCOUNTER — LAB VISIT (OUTPATIENT)
Dept: LAB | Facility: HOSPITAL | Age: 61
End: 2021-02-09
Attending: INTERNAL MEDICINE
Payer: COMMERCIAL

## 2021-02-09 VITALS
RESPIRATION RATE: 18 BRPM | HEIGHT: 71 IN | SYSTOLIC BLOOD PRESSURE: 126 MMHG | WEIGHT: 207.25 LBS | DIASTOLIC BLOOD PRESSURE: 86 MMHG | BODY MASS INDEX: 29.02 KG/M2 | TEMPERATURE: 97 F | OXYGEN SATURATION: 96 % | HEART RATE: 72 BPM

## 2021-02-09 DIAGNOSIS — I10 ESSENTIAL HYPERTENSION: ICD-10-CM

## 2021-02-09 DIAGNOSIS — Z00.00 ROUTINE GENERAL MEDICAL EXAMINATION AT A HEALTH CARE FACILITY: ICD-10-CM

## 2021-02-09 DIAGNOSIS — Z00.00 ROUTINE GENERAL MEDICAL EXAMINATION AT A HEALTH CARE FACILITY: Primary | ICD-10-CM

## 2021-02-09 DIAGNOSIS — M10.9 GOUT, UNSPECIFIED CAUSE, UNSPECIFIED CHRONICITY, UNSPECIFIED SITE: ICD-10-CM

## 2021-02-09 DIAGNOSIS — E78.5 HYPERLIPIDEMIA, UNSPECIFIED HYPERLIPIDEMIA TYPE: ICD-10-CM

## 2021-02-09 LAB
ALBUMIN SERPL BCP-MCNC: 4.5 G/DL (ref 3.5–5.2)
ALP SERPL-CCNC: 37 U/L (ref 55–135)
ALT SERPL W/O P-5'-P-CCNC: 29 U/L (ref 10–44)
ANION GAP SERPL CALC-SCNC: 10 MMOL/L (ref 8–16)
AST SERPL-CCNC: 31 U/L (ref 10–40)
BASOPHILS # BLD AUTO: 0.08 K/UL (ref 0–0.2)
BASOPHILS NFR BLD: 1.6 % (ref 0–1.9)
BILIRUB SERPL-MCNC: 0.7 MG/DL (ref 0.1–1)
BUN SERPL-MCNC: 15 MG/DL (ref 8–23)
CALCIUM SERPL-MCNC: 9.9 MG/DL (ref 8.7–10.5)
CHLORIDE SERPL-SCNC: 102 MMOL/L (ref 95–110)
CHOLEST SERPL-MCNC: 250 MG/DL (ref 120–199)
CHOLEST/HDLC SERPL: 6 {RATIO} (ref 2–5)
CO2 SERPL-SCNC: 29 MMOL/L (ref 23–29)
COMPLEXED PSA SERPL-MCNC: 2.3 NG/ML (ref 0–4)
CREAT SERPL-MCNC: 1.4 MG/DL (ref 0.5–1.4)
DIFFERENTIAL METHOD: ABNORMAL
EOSINOPHIL # BLD AUTO: 0.1 K/UL (ref 0–0.5)
EOSINOPHIL NFR BLD: 1.6 % (ref 0–8)
ERYTHROCYTE [DISTWIDTH] IN BLOOD BY AUTOMATED COUNT: 12.7 % (ref 11.5–14.5)
EST. GFR  (AFRICAN AMERICAN): >60 ML/MIN/1.73 M^2
EST. GFR  (NON AFRICAN AMERICAN): 53.8 ML/MIN/1.73 M^2
ESTIMATED AVG GLUCOSE: 111 MG/DL (ref 68–131)
GLUCOSE SERPL-MCNC: 101 MG/DL (ref 70–110)
HBA1C MFR BLD: 5.5 % (ref 4–5.6)
HCT VFR BLD AUTO: 40.6 % (ref 40–54)
HDLC SERPL-MCNC: 42 MG/DL (ref 40–75)
HDLC SERPL: 16.8 % (ref 20–50)
HGB BLD-MCNC: 13.8 G/DL (ref 14–18)
IMM GRANULOCYTES # BLD AUTO: 0.01 K/UL (ref 0–0.04)
IMM GRANULOCYTES NFR BLD AUTO: 0.2 % (ref 0–0.5)
LDLC SERPL CALC-MCNC: 166.2 MG/DL (ref 63–159)
LYMPHOCYTES # BLD AUTO: 2 K/UL (ref 1–4.8)
LYMPHOCYTES NFR BLD: 40 % (ref 18–48)
MCH RBC QN AUTO: 30.7 PG (ref 27–31)
MCHC RBC AUTO-ENTMCNC: 34 G/DL (ref 32–36)
MCV RBC AUTO: 90 FL (ref 82–98)
MONOCYTES # BLD AUTO: 0.6 K/UL (ref 0.3–1)
MONOCYTES NFR BLD: 12.5 % (ref 4–15)
NEUTROPHILS # BLD AUTO: 2.2 K/UL (ref 1.8–7.7)
NEUTROPHILS NFR BLD: 44.1 % (ref 38–73)
NONHDLC SERPL-MCNC: 208 MG/DL
NRBC BLD-RTO: 0 /100 WBC
PLATELET # BLD AUTO: 275 K/UL (ref 150–350)
PMV BLD AUTO: 12.6 FL (ref 9.2–12.9)
POTASSIUM SERPL-SCNC: 3.6 MMOL/L (ref 3.5–5.1)
PROT SERPL-MCNC: 7.5 G/DL (ref 6–8.4)
RBC # BLD AUTO: 4.5 M/UL (ref 4.6–6.2)
SODIUM SERPL-SCNC: 141 MMOL/L (ref 136–145)
TRIGL SERPL-MCNC: 209 MG/DL (ref 30–150)
TSH SERPL DL<=0.005 MIU/L-ACNC: 2.54 UIU/ML (ref 0.4–4)
URATE SERPL-MCNC: 7.3 MG/DL (ref 3.4–7)
WBC # BLD AUTO: 4.95 K/UL (ref 3.9–12.7)

## 2021-02-09 PROCEDURE — 99999 PR PBB SHADOW E&M-EST. PATIENT-LVL III: ICD-10-PCS | Mod: PBBFAC,,, | Performed by: INTERNAL MEDICINE

## 2021-02-09 PROCEDURE — 99396 PREV VISIT EST AGE 40-64: CPT | Mod: S$GLB,,, | Performed by: INTERNAL MEDICINE

## 2021-02-09 PROCEDURE — 86703 HIV-1/HIV-2 1 RESULT ANTBDY: CPT

## 2021-02-09 PROCEDURE — 84443 ASSAY THYROID STIM HORMONE: CPT

## 2021-02-09 PROCEDURE — 83036 HEMOGLOBIN GLYCOSYLATED A1C: CPT

## 2021-02-09 PROCEDURE — 80053 COMPREHEN METABOLIC PANEL: CPT

## 2021-02-09 PROCEDURE — 80061 LIPID PANEL: CPT

## 2021-02-09 PROCEDURE — 85025 COMPLETE CBC W/AUTO DIFF WBC: CPT

## 2021-02-09 PROCEDURE — 84550 ASSAY OF BLOOD/URIC ACID: CPT

## 2021-02-09 PROCEDURE — 84153 ASSAY OF PSA TOTAL: CPT

## 2021-02-09 PROCEDURE — 99999 PR PBB SHADOW E&M-EST. PATIENT-LVL III: CPT | Mod: PBBFAC,,, | Performed by: INTERNAL MEDICINE

## 2021-02-09 PROCEDURE — 99396 PR PREVENTIVE VISIT,EST,40-64: ICD-10-PCS | Mod: S$GLB,,, | Performed by: INTERNAL MEDICINE

## 2021-02-09 PROCEDURE — 86803 HEPATITIS C AB TEST: CPT

## 2021-02-09 PROCEDURE — 36415 COLL VENOUS BLD VENIPUNCTURE: CPT

## 2021-02-09 RX ORDER — AMLODIPINE, VALSARTAN AND HYDROCHLOROTHIAZIDE 10; 320; 25 MG/1; MG/1; MG/1
1 TABLET ORAL DAILY
Qty: 90 TABLET | Refills: 3 | Status: SHIPPED | OUTPATIENT
Start: 2021-02-09 | End: 2021-02-19 | Stop reason: SDUPTHER

## 2021-02-09 RX ORDER — FENOFIBRATE 54 MG/1
54 TABLET ORAL DAILY
Qty: 90 TABLET | Refills: 3 | Status: SHIPPED | OUTPATIENT
Start: 2021-02-09 | End: 2022-03-01 | Stop reason: SDUPTHER

## 2021-02-10 LAB
HCV AB SERPL QL IA: NEGATIVE
HIV 1+2 AB+HIV1 P24 AG SERPL QL IA: NEGATIVE

## 2021-02-15 ENCOUNTER — TELEPHONE (OUTPATIENT)
Dept: INTERNAL MEDICINE | Facility: CLINIC | Age: 61
End: 2021-02-15

## 2021-02-17 ENCOUNTER — PATIENT MESSAGE (OUTPATIENT)
Dept: INTERNAL MEDICINE | Facility: CLINIC | Age: 61
End: 2021-02-17

## 2021-02-17 DIAGNOSIS — I10 ESSENTIAL HYPERTENSION: ICD-10-CM

## 2021-02-19 RX ORDER — AMLODIPINE, VALSARTAN AND HYDROCHLOROTHIAZIDE 10; 320; 25 MG/1; MG/1; MG/1
1 TABLET ORAL DAILY
Qty: 90 TABLET | Refills: 1 | Status: SHIPPED | OUTPATIENT
Start: 2021-02-19 | End: 2021-03-29

## 2021-03-17 ENCOUNTER — IMMUNIZATION (OUTPATIENT)
Dept: INTERNAL MEDICINE | Facility: CLINIC | Age: 61
End: 2021-03-17
Payer: COMMERCIAL

## 2021-03-17 DIAGNOSIS — Z23 NEED FOR VACCINATION: Primary | ICD-10-CM

## 2021-03-17 PROCEDURE — 91300 COVID-19, MRNA, LNP-S, PF, 30 MCG/0.3 ML DOSE VACCINE: CPT | Mod: PBBFAC | Performed by: FAMILY MEDICINE

## 2021-03-25 ENCOUNTER — TELEPHONE (OUTPATIENT)
Dept: INTERNAL MEDICINE | Facility: CLINIC | Age: 61
End: 2021-03-25

## 2021-03-25 DIAGNOSIS — I10 ESSENTIAL HYPERTENSION: ICD-10-CM

## 2021-03-29 RX ORDER — VALSARTAN 320 MG/1
320 TABLET ORAL DAILY
Qty: 90 TABLET | Refills: 3 | Status: SHIPPED | OUTPATIENT
Start: 2021-03-29 | End: 2022-03-01

## 2021-03-29 RX ORDER — AMLODIPINE BESYLATE 10 MG/1
10 TABLET ORAL DAILY
Qty: 90 TABLET | Refills: 3 | Status: SHIPPED | OUTPATIENT
Start: 2021-03-29 | End: 2022-03-01

## 2021-03-29 RX ORDER — HYDROCHLOROTHIAZIDE 25 MG/1
25 TABLET ORAL DAILY
Qty: 90 TABLET | Refills: 3 | Status: SHIPPED | OUTPATIENT
Start: 2021-03-29 | End: 2022-03-01 | Stop reason: SDUPTHER

## 2021-04-07 ENCOUNTER — IMMUNIZATION (OUTPATIENT)
Dept: INTERNAL MEDICINE | Facility: CLINIC | Age: 61
End: 2021-04-07
Payer: COMMERCIAL

## 2021-04-07 DIAGNOSIS — Z23 NEED FOR VACCINATION: Primary | ICD-10-CM

## 2021-04-07 PROCEDURE — 91300 COVID-19, MRNA, LNP-S, PF, 30 MCG/0.3 ML DOSE VACCINE: ICD-10-PCS | Mod: S$GLB,,, | Performed by: FAMILY MEDICINE

## 2021-04-07 PROCEDURE — 91300 COVID-19, MRNA, LNP-S, PF, 30 MCG/0.3 ML DOSE VACCINE: CPT | Mod: S$GLB,,, | Performed by: FAMILY MEDICINE

## 2021-04-07 PROCEDURE — 0002A COVID-19, MRNA, LNP-S, PF, 30 MCG/0.3 ML DOSE VACCINE: CPT | Mod: CV19,S$GLB,, | Performed by: FAMILY MEDICINE

## 2021-04-07 PROCEDURE — 0002A COVID-19, MRNA, LNP-S, PF, 30 MCG/0.3 ML DOSE VACCINE: ICD-10-PCS | Mod: CV19,S$GLB,, | Performed by: FAMILY MEDICINE

## 2021-10-04 ENCOUNTER — TELEPHONE (OUTPATIENT)
Dept: INTERNAL MEDICINE | Facility: CLINIC | Age: 61
End: 2021-10-04

## 2022-02-22 ENCOUNTER — TELEPHONE (OUTPATIENT)
Dept: INTERNAL MEDICINE | Facility: CLINIC | Age: 62
End: 2022-02-22
Payer: COMMERCIAL

## 2022-02-22 NOTE — TELEPHONE ENCOUNTER
----- Message from Rosie Best sent at 2/22/2022  8:10 AM CST -----  Contact: Darek Oswald is needing a call back in regards to his labs for his annual lab work. Please call back at [phone]

## 2022-03-01 ENCOUNTER — PATIENT MESSAGE (OUTPATIENT)
Dept: ADMINISTRATIVE | Facility: OTHER | Age: 62
End: 2022-03-01
Payer: COMMERCIAL

## 2022-03-01 ENCOUNTER — HOSPITAL ENCOUNTER (OUTPATIENT)
Dept: CARDIOLOGY | Facility: HOSPITAL | Age: 62
Discharge: HOME | End: 2022-03-01
Attending: FAMILY MEDICINE
Payer: COMMERCIAL

## 2022-03-01 ENCOUNTER — HOSPITAL ENCOUNTER (OUTPATIENT)
Dept: RADIOLOGY | Facility: HOSPITAL | Age: 62
Discharge: HOME | End: 2022-03-01
Attending: FAMILY MEDICINE
Payer: COMMERCIAL

## 2022-03-01 ENCOUNTER — OFFICE VISIT (OUTPATIENT)
Dept: INTERNAL MEDICINE | Facility: CLINIC | Age: 62
End: 2022-03-01
Payer: COMMERCIAL

## 2022-03-01 VITALS
HEIGHT: 71 IN | OXYGEN SATURATION: 97 % | BODY MASS INDEX: 28.58 KG/M2 | WEIGHT: 204.13 LBS | DIASTOLIC BLOOD PRESSURE: 80 MMHG | HEART RATE: 80 BPM | SYSTOLIC BLOOD PRESSURE: 144 MMHG | TEMPERATURE: 98 F

## 2022-03-01 DIAGNOSIS — Z23 NEED FOR SHINGLES VACCINE: ICD-10-CM

## 2022-03-01 DIAGNOSIS — E79.0 HYPERURICEMIA: ICD-10-CM

## 2022-03-01 DIAGNOSIS — N18.31 CHRONIC KIDNEY DISEASE, STAGE 3A: ICD-10-CM

## 2022-03-01 DIAGNOSIS — Z23 NEED FOR COVID-19 VACCINE: ICD-10-CM

## 2022-03-01 DIAGNOSIS — I10 PRIMARY HYPERTENSION: Primary | Chronic | ICD-10-CM

## 2022-03-01 DIAGNOSIS — K21.9 GASTROESOPHAGEAL REFLUX DISEASE WITHOUT ESOPHAGITIS: ICD-10-CM

## 2022-03-01 DIAGNOSIS — I82.402 ACUTE DEEP VEIN THROMBOSIS (DVT) OF LEFT LOWER EXTREMITY, UNSPECIFIED VEIN: ICD-10-CM

## 2022-03-01 DIAGNOSIS — G47.33 OBSTRUCTIVE SLEEP APNEA SYNDROME: ICD-10-CM

## 2022-03-01 DIAGNOSIS — I10 PRIMARY HYPERTENSION: Chronic | ICD-10-CM

## 2022-03-01 DIAGNOSIS — E78.2 MIXED HYPERLIPIDEMIA: ICD-10-CM

## 2022-03-01 DIAGNOSIS — Z12.5 SCREENING PSA (PROSTATE SPECIFIC ANTIGEN): ICD-10-CM

## 2022-03-01 PROBLEM — J32.0 MAXILLARY SINUSITIS: Status: RESOLVED | Noted: 2017-12-18 | Resolved: 2022-03-01

## 2022-03-01 PROBLEM — R05.9 COUGH: Status: RESOLVED | Noted: 2017-12-18 | Resolved: 2022-03-01

## 2022-03-01 LAB
ALBUMIN SERPL BCP-MCNC: 4.8 G/DL (ref 3.5–5.2)
ALT SERPL W/O P-5'-P-CCNC: 25 U/L (ref 10–44)
ANION GAP SERPL CALC-SCNC: 13 MMOL/L (ref 8–16)
AST SERPL-CCNC: 25 U/L (ref 10–40)
BILIRUB UR QL STRIP: NEGATIVE
BUN SERPL-MCNC: 18 MG/DL (ref 8–23)
CALCIUM SERPL-MCNC: 10.7 MG/DL (ref 8.7–10.5)
CHLORIDE SERPL-SCNC: 99 MMOL/L (ref 95–110)
CHOLEST SERPL-MCNC: 265 MG/DL (ref 120–199)
CHOLEST/HDLC SERPL: 5.3 {RATIO} (ref 2–5)
CLARITY UR: CLEAR
CO2 SERPL-SCNC: 26 MMOL/L (ref 23–29)
COLOR UR: YELLOW
CREAT SERPL-MCNC: 1.2 MG/DL (ref 0.5–1.4)
EST. GFR  (AFRICAN AMERICAN): >60 ML/MIN/1.73 M^2
EST. GFR  (NON AFRICAN AMERICAN): >60 ML/MIN/1.73 M^2
GLUCOSE SERPL-MCNC: 87 MG/DL (ref 70–110)
GLUCOSE UR QL STRIP: NEGATIVE
HDLC SERPL-MCNC: 50 MG/DL (ref 40–75)
HDLC SERPL: 18.9 % (ref 20–50)
HGB UR QL STRIP: NEGATIVE
KETONES UR QL STRIP: NEGATIVE
LDLC SERPL CALC-MCNC: 172.2 MG/DL (ref 63–159)
LEUKOCYTE ESTERASE UR QL STRIP: NEGATIVE
NITRITE UR QL STRIP: NEGATIVE
NONHDLC SERPL-MCNC: 215 MG/DL
PH UR STRIP: 8 [PH] (ref 5–8)
PHOSPHATE SERPL-MCNC: 2.4 MG/DL (ref 2.7–4.5)
POTASSIUM SERPL-SCNC: 3.6 MMOL/L (ref 3.5–5.1)
PROT UR QL STRIP: NEGATIVE
PTH-INTACT SERPL-MCNC: 45.5 PG/ML (ref 9–77)
SODIUM SERPL-SCNC: 138 MMOL/L (ref 136–145)
SP GR UR STRIP: 1.01 (ref 1–1.03)
TRIGL SERPL-MCNC: 214 MG/DL (ref 30–150)
URATE SERPL-MCNC: 6 MG/DL (ref 3.4–7)
URN SPEC COLLECT METH UR: NORMAL

## 2022-03-01 PROCEDURE — 93005 ELECTROCARDIOGRAM TRACING: CPT

## 2022-03-01 PROCEDURE — 99215 PR OFFICE/OUTPT VISIT, EST, LEVL V, 40-54 MIN: ICD-10-PCS | Mod: S$GLB,,, | Performed by: FAMILY MEDICINE

## 2022-03-01 PROCEDURE — 80061 LIPID PANEL: CPT | Performed by: FAMILY MEDICINE

## 2022-03-01 PROCEDURE — 93010 ELECTROCARDIOGRAM REPORT: CPT | Mod: ,,, | Performed by: INTERNAL MEDICINE

## 2022-03-01 PROCEDURE — 99999 PR PBB SHADOW E&M-EST. PATIENT-LVL IV: ICD-10-PCS | Mod: PBBFAC,,, | Performed by: FAMILY MEDICINE

## 2022-03-01 PROCEDURE — 81003 URINALYSIS AUTO W/O SCOPE: CPT | Performed by: FAMILY MEDICINE

## 2022-03-01 PROCEDURE — 93010 EKG 12-LEAD: ICD-10-PCS | Mod: ,,, | Performed by: INTERNAL MEDICINE

## 2022-03-01 PROCEDURE — 76770 US EXAM ABDO BACK WALL COMP: CPT | Mod: 26,,, | Performed by: RADIOLOGY

## 2022-03-01 PROCEDURE — 84450 TRANSFERASE (AST) (SGOT): CPT | Performed by: FAMILY MEDICINE

## 2022-03-01 PROCEDURE — 84460 ALANINE AMINO (ALT) (SGPT): CPT | Performed by: FAMILY MEDICINE

## 2022-03-01 PROCEDURE — 99215 OFFICE O/P EST HI 40 MIN: CPT | Mod: S$GLB,,, | Performed by: FAMILY MEDICINE

## 2022-03-01 PROCEDURE — 76770 US EXAM ABDO BACK WALL COMP: CPT | Mod: TC

## 2022-03-01 PROCEDURE — 99999 PR PBB SHADOW E&M-EST. PATIENT-LVL IV: CPT | Mod: PBBFAC,,, | Performed by: FAMILY MEDICINE

## 2022-03-01 PROCEDURE — 76770 US RETROPERITONEAL COMPLETE: ICD-10-PCS | Mod: 26,,, | Performed by: RADIOLOGY

## 2022-03-01 PROCEDURE — 83970 ASSAY OF PARATHORMONE: CPT | Performed by: FAMILY MEDICINE

## 2022-03-01 PROCEDURE — 84550 ASSAY OF BLOOD/URIC ACID: CPT | Performed by: FAMILY MEDICINE

## 2022-03-01 PROCEDURE — 80069 RENAL FUNCTION PANEL: CPT | Performed by: FAMILY MEDICINE

## 2022-03-01 RX ORDER — APIXABAN 5 MG/1
5 TABLET, FILM COATED ORAL 2 TIMES DAILY
COMMUNITY
Start: 2021-12-20 | End: 2022-03-09

## 2022-03-01 RX ORDER — HYDROCHLOROTHIAZIDE 25 MG/1
25 TABLET ORAL DAILY
Qty: 90 TABLET | Refills: 0 | Status: SHIPPED | OUTPATIENT
Start: 2022-03-01 | End: 2022-06-01 | Stop reason: SDUPTHER

## 2022-03-01 RX ORDER — AMLODIPINE AND VALSARTAN 10; 320 MG/1; MG/1
1 TABLET ORAL DAILY
Qty: 90 TABLET | Refills: 0 | Status: SHIPPED | OUTPATIENT
Start: 2022-03-01 | End: 2022-06-01 | Stop reason: SDUPTHER

## 2022-03-01 RX ORDER — FENOFIBRATE 54 MG/1
54 TABLET ORAL DAILY
Qty: 90 TABLET | Refills: 3 | Status: SHIPPED | OUTPATIENT
Start: 2022-03-01 | End: 2022-06-01 | Stop reason: ALTCHOICE

## 2022-03-01 NOTE — PATIENT INSTRUCTIONS
"Someone from [facility] will be contacting you soon to help you get the home sleep test done. If you haven't heard from them within 2 weeks, please call the [facility] at [phone] and let them know that you were ordered to have a home sleep test done and that you haven't yet been contacted. IMPORTANT: Do not drive or perform hazardous activities while drowsy.         I strongly recommend that you get your COVID-19 vaccine booster. I've already had mine.    The booster shot is necessary to be considered "fully vaccinated" against COVID-19. People who are fully vaccinated are much better protected from severe illness from COVID-19 than people who are unvaccinated or not fully vaccinated.    If your primary COVID-19 vaccine series was Pfizer, you should get a Pfizer booster.    You can learn more about the COVID-19 vaccine and booster shot options at [email or web address].    The quickest and easiest way to schedule an appointment for your COVID-19 booster vaccination is from the [email or web address] or your [facility] account online at [email or web address]. You can also schedule an appointment for your COVID-19 vaccination by calling [phone].    Please take care of yourself. You are important to me!        Controlling your blood pressure is very important because we know that people who have their blood pressure well-controlled live longer and have lower risk of heart disease, stroke, and kidney failure.    I want you to be successful in taking care of yourself and controlling your blood pressure, so I've entered a referral order for you to be able to participate in [facility]'s excellent Hypert[facility] program.    The program allows you to use an electronic blood pressure monitor to measure your blood pressure at home. Those numbers are automatically transmitted, via your smartphone, to your hypertension care team for review. You'll receive regular feedback from your care " team, including recommendations for any needed medication adjustments, exercise and healthy eating tips, heart-healthy coaching, and monthly progress reports.    The program offers essential information and expert care on the four key ways blood pressure is managed:    1. Healthy eating;    2. Physical activity;    3. Self-monitoring; and    4. Medication.    Managing blood pressure is a lifelong journey. We understand that controlling your blood pressure - along with everyday responsibilities such as work, family, and planning for the future - can feel overwhelming at times. High blood pressure does not have to slow you down. With the help of our dedicated specialists and valuable education, you will have the support you need to manage your blood pressure with confidence so you can focus on what's most important to you.    Enrolling is easy and quick. Just stop by the [facility] in the first-floor lobby to get started. You can also call the [facility] Help Desk at [phone] for help getting set up and for any technical support.

## 2022-03-01 NOTE — ASSESSMENT & PLAN NOTE
Lab Results   Component Value Date    CHOL 250 (H) 02/09/2021    CHOL 259 (H) 01/28/2020    TRIG 209 (H) 02/09/2021    TRIG 217 (H) 01/28/2020    HDL 42 02/09/2021    HDL 43 01/28/2020    LDLCALC 166.2 (H) 02/09/2021    LDLCALC 172.6 (H) 01/28/2020    NONHDLCHOL 208 02/09/2021    NONHDLCHOL 216 01/28/2020    AST 31 02/09/2021    ALT 29 02/09/2021     The 10-year ASCVD risk score (Analia CROWE Jr., et al., 2013) is: 19.1%    Values used to calculate the score:      Age: 62 years      Sex: Male      Is Non- : No      Diabetic: No      Tobacco smoker: No      Systolic Blood Pressure: 144 mmHg      Is BP treated: Yes      HDL Cholesterol: 42 mg/dL      Total Cholesterol: 250 mg/dL

## 2022-03-01 NOTE — ASSESSMENT & PLAN NOTE
BP Readings from Last 6 Encounters:   03/01/22 (!) 144/80   02/09/21 126/86   01/28/20 130/82   11/15/18 (!) 142/100   12/18/17 138/89   11/07/17 (!) 146/89

## 2022-03-01 NOTE — ASSESSMENT & PLAN NOTE
STOP-BANG questionnaire to assess risk for obstructive sleep apnea (ARNOLDO)  · Snoring: Do you snore loudly? ANSWER: YES  · Tired: Do you often feel tired, fatigued, or sleepy during daytime? ANSWER: YES [place] Sleepiness Scale Score = 8 (HIGHER NORMAL Daytime Sleepiness)  · Observed: Has anyone observed you stop breathing during your sleep? ANSWER: YES  · Pressure: Do you have or are you being treated for high blood pressure? ANSWER: YES  · BMI: BMI more than 35 kg/m2? ANSWER: NO (BMI = Body mass index is 28.47 kg/m².)   · Age: Age over 50 yr old? ANSWER: YES (Age = 62 y.o.)  · Neck circumference: Neck circumference greater than 40 cm? ANSWER: YES (Neck circumference = 43 cm, Mallampati class 4 oropharynx.  · Gender: Gender male? ANSWER: YES (Gender = male)    STOP-BANG Score = 7 (HIGH risk of ARNOLDO)    INSTRUCTIONS PROVIDED: Do not drive or perform hazardous activities while drowsy.

## 2022-03-01 NOTE — ASSESSMENT & PLAN NOTE
Unprovoked LLE DVT in Sep-Oct. Started on Eliquis.  Vascular: Alexis Mejia MD. Has appointment with him next week, anticipates D/C Eliquis.

## 2022-03-01 NOTE — PROGRESS NOTES
OFFICE VISIT 3/1/22 10:00 AM CST  LAST ENCOUNTER WITH ME:  Visit date not found   CHIEF COMPLAINT: Establish Care    This is my first time treating Darek. Any problems addressed today are NEW TO ME.  Darek is requesting that I assume the role of their primary care provider.     HEALTH MAINTENANCE INTERVENTIONS - UP TO DATE  Health Maintenance Topics with due status: Not Due       Topic Last Completion Date    Colorectal Cancer Screening 11/07/2017       HEALTH MAINTENANCE INTERVENTIONS - DUE OR DUE SOON  Health Maintenance Due   Topic Date Due    TETANUS VACCINE  Never done    Shingles Vaccine (1 of 2) Never done    Influenza Vaccine (1) 09/01/2021    COVID-19 Vaccine (3 - Booster for Pfizer series) 09/07/2021    PROSTATE-SPECIFIC ANTIGEN  02/09/2022    Lipid Panel  02/09/2022     DIAGNOSES SPECIFICALLY EVALUATED AND TREATED THIS ENCOUNTER  1. Primary hypertension  - Hypertension Digital Medicine (HDMP) Enrollment Order  - Hypertension Digital Medicine (HDMP): Assign Onboarding Questionnaires  - SCHEDULED EKG 12-LEAD (to Muse); Future  - URINALYSIS  - SCHEDULED EKG 12-LEAD (to Muse); Future  - hydroCHLOROthiazide (HYDRODIURIL) 25 MG tablet; Take 1 tablet (25 mg total) by mouth once daily.  Dispense: 90 tablet; Refill: 0  - amlodipine-valsartan (EXFORGE)  mg per tablet; Take 1 tablet by mouth once daily.  Dispense: 90 tablet; Refill: 0  - Renal Function Panel    2. Mixed hyperlipidemia  - fenofibrate (TRICOR) 54 MG tablet; Take 1 tablet (54 mg total) by mouth once daily.  Dispense: 90 tablet; Refill: 3  - AST (SGOT)  - ALT (SGPT)  - Lipid Panel    3. Gastroesophageal reflux disease without esophagitis    4. Chronic kidney disease, stage 3a  - US Retroperitoneal Complete (Kidney and; Future  - Renal Function Panel  - PTH, intact    5. Obstructive sleep apnea syndrome  - Home Sleep Studies; Future    6. Acute deep vein thrombosis (DVT) of left lower extremity, unspecified vein    7. Screening PSA (prostate  specific antigen)    8. Need for COVID-19 vaccine    9. Need for shingles vaccine  - varicella-zoster gE-AS01B, PF, (SHINGRIX) 50 mcg/0.5 mL injection; Inject 0.5 mL (one dose) into muscle now; schedule second dose  days later  Dispense: 1 each; Refill: 1    10. Hyperuricemia  - Uric Acid     Follow up in about 3 months (around 6/1/2022).    Problem List Items Addressed This Visit     Primary hypertension - Primary (Chronic)    Current Assessment & Plan     BP Readings from Last 6 Encounters:   03/01/22 (!) 144/80   02/09/21 126/86   01/28/20 130/82   11/15/18 (!) 142/100   12/18/17 138/89   11/07/17 (!) 146/89              Relevant Medications    hydroCHLOROthiazide (HYDRODIURIL) 25 MG tablet    amlodipine-valsartan (EXFORGE)  mg per tablet    Other Relevant Orders    Hypertension Multispectral Imaging Medicine (HDMP) Enrollment Order (Completed)    Hypertension Digital Medicine ([facility]): Assign Onboarding Questionnaires (Completed)    SCHEDULED EKG 12-LEAD (to Muse)    URINALYSIS    SCHEDULED EKG 12-LEAD (to Muse)    Renal Function Panel    Hyperuricemia    Relevant Orders    Uric Acid    Gastroesophageal reflux disease without esophagitis    Mixed hyperlipidemia    Current Assessment & Plan     Lab Results   Component Value Date    CHOL 250 (H) 02/09/2021    CHOL 259 (H) 01/28/2020    TRIG 209 (H) 02/09/2021    TRIG 217 (H) 01/28/2020    HDL 42 02/09/2021    HDL 43 01/28/2020    LDLCALC 166.2 (H) 02/09/2021    LDLCALC 172.6 (H) 01/28/2020    NONHDLCHOL 208 02/09/2021    NONHDLCHOL 216 01/28/2020    AST 31 02/09/2021    ALT 29 02/09/2021     The 10-year ASCVD risk score (Analia CROWE Jr., et al., 2013) is: 19.1%    Values used to calculate the score:      Age: 62 years      Sex: Male      Is Non- : No      Diabetic: No      Tobacco smoker: No      Systolic Blood Pressure: 144 mmHg      Is BP treated: Yes      HDL Cholesterol: 42 mg/dL      Total Cholesterol: 250 mg/dL            Relevant Medications     fenofibrate (TRICOR) 54 MG tablet    Other Relevant Orders    AST (SGOT)    ALT (SGPT)    Lipid Panel    Chronic kidney disease, stage 3a    Relevant Orders    US Retroperitoneal Complete (Kidney and    Renal Function Panel    PTH, intact    Obstructive sleep apnea syndrome    Current Assessment & Plan     STOP-BANG questionnaire to assess risk for obstructive sleep apnea (ARNOLDO)  · Snoring: Do you snore loudly? ANSWER: YES  · Tired: Do you often feel tired, fatigued, or sleepy during daytime? ANSWER: YES [place] Sleepiness Scale Score = 8 (HIGHER NORMAL Daytime Sleepiness)  · Observed: Has anyone observed you stop breathing during your sleep? ANSWER: YES  · Pressure: Do you have or are you being treated for high blood pressure? ANSWER: YES  · BMI: BMI more than 35 kg/m2? ANSWER: NO (BMI = Body mass index is 28.47 kg/m².)   · Age: Age over 50 yr old? ANSWER: YES (Age = 62 y.o.)  · Neck circumference: Neck circumference greater than 40 cm? ANSWER: YES (Neck circumference = 43 cm, Mallampati class 4 oropharynx.  · Gender: Gender male? ANSWER: YES (Gender = male)    STOP-BANG Score = 7 (HIGH risk of ARNOLDO)    INSTRUCTIONS PROVIDED: Do not drive or perform hazardous activities while drowsy.            Relevant Orders    Home Sleep Studies    Acute deep vein thrombosis (DVT) of left lower extremity    Current Assessment & Plan     Unprovoked LLE DVT in Sep-Oct. Started on Eliquis.  Vascular: Alexis Mejia MD. Has appointment with him next week, anticipates D/C Eliquis.             Other Visit Diagnoses     Screening PSA (prostate specific antigen)        Need for COVID-19 vaccine        Need for shingles vaccine        Relevant Medications    varicella-zoster gE-AS01B, PF, (SHINGRIX) 50 mcg/0.5 mL injection      Unless noted herein, any chronic conditions are represented as and appear compensated/controlled and stable, and no other significant complaints or concerns were reported.    PRESCRIPTION DRUG  MANAGEMENT  Outpatient Medications Prior to Visit   Medication Sig Dispense Refill    ELIQUIS 5 mg Tab Take 5 mg by mouth 2 (two) times daily.      omeprazole (PRILOSEC) 20 MG capsule Take 20 mg by mouth once daily.      amLODIPine (NORVASC) 10 MG tablet Take 1 tablet (10 mg total) by mouth once daily. 90 tablet 3    fenofibrate (TRICOR) 54 MG tablet Take 1 tablet (54 mg total) by mouth once daily. 90 tablet 3    hydroCHLOROthiazide (HYDRODIURIL) 25 MG tablet Take 1 tablet (25 mg total) by mouth once daily. 90 tablet 3    valsartan (DIOVAN) 320 MG tablet Take 1 tablet (320 mg total) by mouth once daily. 90 tablet 3     No facility-administered medications prior to visit.     Medications Discontinued During This Encounter   Medication Reason    amLODIPine (NORVASC) 10 MG tablet Change in Dosage Form    valsartan (DIOVAN) 320 MG tablet Change in Dosage Form    fenofibrate (TRICOR) 54 MG tablet Reorder    hydroCHLOROthiazide (HYDRODIURIL) 25 MG tablet Reorder     Medications Ordered This Encounter   Medications    amlodipine-valsartan (EXFORGE)  mg per tablet     Sig: Take 1 tablet by mouth once daily.     Dispense:  90 tablet     Refill:  0     This REPLACES individual prescriptions for amlodipine and valsartan. DISCONTINUE any existing prescription for amlodipine and valsartan.    fenofibrate (TRICOR) 54 MG tablet     Sig: Take 1 tablet (54 mg total) by mouth once daily.     Dispense:  90 tablet     Refill:  3    hydroCHLOROthiazide (HYDRODIURIL) 25 MG tablet     Sig: Take 1 tablet (25 mg total) by mouth once daily.     Dispense:  90 tablet     Refill:  0     -    varicella-zoster gE-AS01B, PF, (SHINGRIX) 50 mcg/0.5 mL injection     Sig: Inject 0.5 mL (one dose) into muscle now; schedule second dose  days later     Dispense:  1 each     Refill:  1     Review of Systems   Respiratory: Negative for chest tightness and shortness of breath.    Cardiovascular: Negative for chest pain.  "  Endocrine: Negative for polydipsia and polyuria.      PHYSICAL EXAM  Vitals:    03/01/22 1006 03/01/22 1013 03/01/22 1017   BP: (!) 140/80 (!) 150/80 (!) 144/80   BP Location: Right arm Left arm Left arm   Patient Position: Sitting Sitting Sitting   BP Method: Medium (Manual) Large (Manual) Large (Manual)   Pulse: 80 80    Temp: 98.3 °F (36.8 °C)     TempSrc: Oral     SpO2: 97%     Weight: 92.6 kg (204 lb 2.3 oz)     Height: 5' 11" (1.803 m)     CONSTITUTIONAL: Vital signs noted. No apparent distress. Does not appear acutely ill or septic. Appears adequately hydrated.  EYE: Sclerae anicteric. Lids and conjunctiva unremarkable.  ENT: External ENT unremarkable. Hearing grossly intact.  PULM: Lungs clear. Breathing unlabored.  CV: Auscultation reveals regular rate and rhythm. Normal heart sounds. No carotid bruit.  GI: Soft and nontender. Bowel sounds normal.  DERM: Skin warm and moist with normal turgor.  NEURO: There are no gross focal motor deficits or gross deficits of cranial nerves III-XII.  PSYCH: Alert and oriented x 3. Mood is grossly neutral. Affect appropriate. Judgment and insight grossly intact.  Orders Placed This Encounter    US Retroperitoneal Complete (Kidney and    URINALYSIS    AST (SGOT)    ALT (SGPT)    Lipid Panel    Renal Function Panel    PTH, intact    Uric Acid    Hypertension Digital Medicine (HDMP) Enrollment Order    SCHEDULED EKG 12-LEAD (to Muse)    SCHEDULED EKG 12-LEAD (to [place])    Home Sleep Studies    varicella-zoster gE-AS01B, PF, (SHINGRIX) 50 mcg/0.5 mL injection    fenofibrate (TRICOR) 54 MG tablet    hydroCHLOROthiazide (HYDRODIURIL) 25 MG tablet    amlodipine-valsartan (EXFORGE)  mg per tablet    Hypertension Digital Medicine (HDMP): Assign Onboarding Questionnaires     TOTAL TIME evaluating and managing this patient for this encounter was greater than or equal to 50 minutes. This time was spent personally by me on the following activities: pre-charting, " "review of patient's past medical history, assessing age-appropriate health maintenance needs, review of any interval history, review and interpretation of lab results, review and interpretation of cardiology test results, obtaining history from the patient, evaluation of the patient's response to treatment, examination of the patient, medication reconciliation, managing and/or ordering prescription medications, medication counseling, ordering labs, ordering imaging tests, ordering cardiology tests, ordering other diagnostic tests, educating patient and answering their questions about risks and benefits of treatment options, educating patient and answering their questions about diagnosis, treatment plan, and goals of treatment, discussing planned follow-up and final documentation of the visit. This time was exclusive of any separately billable procedures for this patient and exclusive of time spent treating any other patients.   Documentation entered by me for this encounter may have been done in part using speech-recognition technology. Although I have made an effort to ensure accuracy, "sound like" errors may exist and should be interpreted in context.   "

## 2022-03-02 ENCOUNTER — TELEPHONE (OUTPATIENT)
Dept: PULMONOLOGY | Facility: HOSPITAL | Age: 62
End: 2022-03-02
Payer: COMMERCIAL

## 2022-03-02 DIAGNOSIS — I10 PRIMARY HYPERTENSION: ICD-10-CM

## 2022-03-02 NOTE — PROGRESS NOTES
We'll discuss these test results at upcoming appointment with me already scheduled.  Future Appointments  3/15/2022  10:15 AM   NURSE, [facility]             [place]  3/18/2022  11:00 AM   MD ELLIOTT Keller              [place]  6/1/2022   8:30 AM    MD ELLIOTT Ray              [place]

## 2022-03-07 NOTE — TELEPHONE ENCOUNTER
Please check with patient to see if he asked Dr. Haile to be his pcp. If he did then Dr. Haile will need to order labs. If he is planning to f/u with me then let me know and I will order.

## 2022-03-15 ENCOUNTER — CLINICAL SUPPORT (OUTPATIENT)
Dept: INTERNAL MEDICINE | Facility: CLINIC | Age: 62
End: 2022-03-15
Payer: COMMERCIAL

## 2022-03-15 ENCOUNTER — TELEPHONE (OUTPATIENT)
Dept: INTERNAL MEDICINE | Facility: CLINIC | Age: 62
End: 2022-03-15

## 2022-03-15 VITALS — OXYGEN SATURATION: 98 % | HEART RATE: 85 BPM | DIASTOLIC BLOOD PRESSURE: 84 MMHG | SYSTOLIC BLOOD PRESSURE: 136 MMHG

## 2022-03-15 DIAGNOSIS — I10 HYPERTENSION, UNSPECIFIED TYPE: Primary | ICD-10-CM

## 2022-03-15 PROCEDURE — 99999 PR PBB SHADOW E&M-EST. PATIENT-LVL III: ICD-10-PCS | Mod: PBBFAC,,,

## 2022-03-15 PROCEDURE — 99999 PR PBB SHADOW E&M-EST. PATIENT-LVL III: CPT | Mod: PBBFAC,,,

## 2022-03-15 NOTE — TELEPHONE ENCOUNTER
Pt came in today for a b/p check 136/84, pulse 85, ox 98 no complaint of pain and informed me that medications was taken two hours prior to visit, pt was informed visit will be sent to PCP for review and if there is any concerns we will give her a call pt verbalized understanding.

## 2022-04-10 NOTE — TELEPHONE ENCOUNTER
BP Readings from Last 6 Encounters:   03/15/22 136/84   03/01/22 (!) 144/80   02/09/21 126/86   01/28/20 130/82   11/15/18 (!) 142/100   12/18/17 138/89      Last 5 Patient Entered Readings                Current 30 Day Average: 141/85  Recent Readings 4/9/2022 4/9/2022 4/9/2022 4/9/2022 4/8/2022   SBP (mmHg) 136 138 149 151 141   DBP (mmHg) 77 85 87 92 91   Pulse 71 71 75 77 77                 Lab Results   Component Value Date    ESTGFRAFRICA >60.0 03/01/2022    EGFRNONAA >60.0 03/01/2022    CREATININE 1.2 03/01/2022    BUN 18 03/01/2022    K 3.6 03/01/2022     03/01/2022    CL 99 03/01/2022     Results for orders placed or performed during the hospital encounter of 03/01/22   SCHEDULED EKG 12-LEAD (to Muse)    Collection Time: 03/01/22 12:33 PM    Narrative    Test Reason : I10,    Vent. Rate : 063 BPM     Atrial Rate : 063 BPM     P-R Int : 146 ms          QRS Dur : 092 ms      QT Int : 380 ms       P-R-T Axes : 038 009 014 degrees     QTc Int : 388 ms    Normal sinus rhythm  Normal ECG  No previous ECGs available  Confirmed by CARL ALMANZA MD ([id number]) on 3/1/2022 9:49:36 PM    Referred By: RACHEL DE LA TORRE           Confirmed By:CARL ALMANZA MD

## 2022-05-02 NOTE — PROGRESS NOTES
We'll discuss these test results at upcoming appointment with me already scheduled.  Future Appointments  6/1/2022   8:30 AM    RACHEL Haile MD      [facility]             [place]

## 2022-06-01 ENCOUNTER — OFFICE VISIT (OUTPATIENT)
Dept: INTERNAL MEDICINE | Facility: CLINIC | Age: 62
End: 2022-06-01
Payer: COMMERCIAL

## 2022-06-01 VITALS
DIASTOLIC BLOOD PRESSURE: 82 MMHG | HEART RATE: 83 BPM | WEIGHT: 200.38 LBS | TEMPERATURE: 98 F | SYSTOLIC BLOOD PRESSURE: 124 MMHG | OXYGEN SATURATION: 98 % | BODY MASS INDEX: 27.95 KG/M2

## 2022-06-01 DIAGNOSIS — Z12.5 SCREENING PSA (PROSTATE SPECIFIC ANTIGEN): ICD-10-CM

## 2022-06-01 DIAGNOSIS — Z23 NEED FOR COVID-19 VACCINE: ICD-10-CM

## 2022-06-01 DIAGNOSIS — E83.52 HYPERCALCEMIA: Chronic | ICD-10-CM

## 2022-06-01 DIAGNOSIS — E78.2 MIXED HYPERLIPIDEMIA: Chronic | ICD-10-CM

## 2022-06-01 DIAGNOSIS — I10 PRIMARY HYPERTENSION: Primary | Chronic | ICD-10-CM

## 2022-06-01 DIAGNOSIS — K76.0 FATTY LIVER: Chronic | ICD-10-CM

## 2022-06-01 DIAGNOSIS — E79.0 HYPERURICEMIA: ICD-10-CM

## 2022-06-01 DIAGNOSIS — G47.33 OBSTRUCTIVE SLEEP APNEA SYNDROME: ICD-10-CM

## 2022-06-01 DIAGNOSIS — Z23 NEED FOR SHINGLES VACCINE: ICD-10-CM

## 2022-06-01 DIAGNOSIS — Z23 NEED FOR DIPHTHERIA-TETANUS-PERTUSSIS (TDAP) VACCINE: ICD-10-CM

## 2022-06-01 DIAGNOSIS — N18.31 CHRONIC KIDNEY DISEASE, STAGE 3A: ICD-10-CM

## 2022-06-01 PROBLEM — I82.402 ACUTE DEEP VEIN THROMBOSIS (DVT) OF LEFT LOWER EXTREMITY: Chronic | Status: ACTIVE | Noted: 2022-03-01

## 2022-06-01 PROCEDURE — 99396 PR PREVENTIVE VISIT,EST,40-64: ICD-10-PCS | Mod: S$GLB,,, | Performed by: FAMILY MEDICINE

## 2022-06-01 PROCEDURE — 99999 PR PBB SHADOW E&M-EST. PATIENT-LVL IV: ICD-10-PCS | Mod: PBBFAC,,, | Performed by: FAMILY MEDICINE

## 2022-06-01 PROCEDURE — 99999 PR PBB SHADOW E&M-EST. PATIENT-LVL IV: CPT | Mod: PBBFAC,,, | Performed by: FAMILY MEDICINE

## 2022-06-01 PROCEDURE — 99396 PREV VISIT EST AGE 40-64: CPT | Mod: S$GLB,,, | Performed by: FAMILY MEDICINE

## 2022-06-01 RX ORDER — AMLODIPINE AND VALSARTAN 10; 320 MG/1; MG/1
1 TABLET ORAL DAILY
Qty: 90 TABLET | Refills: 2 | Status: SHIPPED | OUTPATIENT
Start: 2022-06-01 | End: 2022-12-15 | Stop reason: SDUPTHER

## 2022-06-01 RX ORDER — ROSUVASTATIN CALCIUM 20 MG/1
20 TABLET, COATED ORAL NIGHTLY
Qty: 90 TABLET | Refills: 3 | Status: SHIPPED | OUTPATIENT
Start: 2022-06-01 | End: 2022-12-15 | Stop reason: SDUPTHER

## 2022-06-01 RX ORDER — HYDROCHLOROTHIAZIDE 25 MG/1
25 TABLET ORAL DAILY
Qty: 90 TABLET | Refills: 2 | Status: SHIPPED | OUTPATIENT
Start: 2022-06-01 | End: 2022-12-15 | Stop reason: SDUPTHER

## 2022-06-01 NOTE — ASSESSMENT & PLAN NOTE
He gave informed refusal for any further evaluation and management of this condition. I instructed him to let me know if he changes his mind.

## 2022-06-01 NOTE — ASSESSMENT & PLAN NOTE
Lab Results   Component Value Date    URICACID 6.0 03/01/2022    URICACID 7.3 (H) 02/09/2021    URICACID 6.0 11/15/2018    ESTGFRAFRICA >60.0 03/01/2022    EGFRNONAA >60.0 03/01/2022    CREATININE 1.2 03/01/2022    BUN 18 03/01/2022

## 2022-06-01 NOTE — ASSESSMENT & PLAN NOTE
Lab Results   Component Value Date    ESTGFRAFRICA >60.0 03/01/2022    ESTGFRAFRICA >60.0 02/09/2021    ESTGFRAFRICA 57.7 (A) 01/28/2020    EGFRNONAA >60.0 03/01/2022    EGFRNONAA 53.8 (A) 02/09/2021    EGFRNONAA 49.9 (A) 01/28/2020    CREATININE 1.2 03/01/2022    CREATININE 1.4 02/09/2021    CREATININE 1.5 (H) 01/28/2020    BUN 18 03/01/2022    BUN 15 02/09/2021    BUN 18 01/28/2020

## 2022-06-01 NOTE — ASSESSMENT & PLAN NOTE
Lab Results   Component Value Date    CALCIUM 10.7 (H) 03/01/2022    CALCIUM 9.9 02/09/2021    CALCIUM 10.2 01/28/2020    CALCIUM 9.9 11/15/2018    CALCIUM 9.9 11/08/2017     Lab Results   Component Value Date    PHOS 2.4 (L) 03/01/2022    ALBUMIN 4.8 03/01/2022    ALBUMIN 4.5 02/09/2021     Lab Results   Component Value Date    PTH 45.5 03/01/2022     No results found for: URTIMEDVOL, UCD, LABCALC, [id number], CAURMGSPEC

## 2022-06-01 NOTE — ASSESSMENT & PLAN NOTE
BP Readings from Last 6 Encounters:   06/01/22 124/82   03/15/22 136/84   03/01/22 (!) 144/80   02/09/21 126/86   01/28/20 130/82   11/15/18 (!) 142/100      Last 5 Patient Entered Readings                Current 30 Day Average: 132/80  Recent Readings 6/1/2022 5/31/2022 5/31/2022 5/31/2022 5/31/2022   SBP (mmHg) 111 130 119 131 136   DBP (mmHg) 72 81 74 79 85   Pulse 100 71 68 69 72                 Lab Results   Component Value Date    ESTGFRAFRICA >60.0 03/01/2022    EGFRNONAA >60.0 03/01/2022    CREATININE 1.2 03/01/2022    BUN 18 03/01/2022    K 3.6 03/01/2022     03/01/2022    CL 99 03/01/2022     Results for orders placed or performed during the hospital encounter of 03/01/22   SCHEDULED EKG 12-LEAD (to Muse)    Collection Time: 03/01/22 12:33 PM    Narrative    Test Reason : I10,    Vent. Rate : 063 BPM     Atrial Rate : 063 BPM     P-R Int : 146 ms          QRS Dur : 092 ms      QT Int : 380 ms       P-R-T Axes : 038 009 014 degrees     QTc Int : 388 ms    Normal sinus rhythm  Normal ECG  No previous ECGs available  Confirmed by CARL ALMANZA MD ([id number]) on 3/1/2022 9:49:36 PM    Referred By: RACHEL DE LA TORRE           Confirmed By:CARL ALMANZA MD

## 2022-06-01 NOTE — ASSESSMENT & PLAN NOTE
Lab Results   Component Value Date    CHOL 265 (H) 03/01/2022    CHOL 250 (H) 02/09/2021    TRIG 214 (H) 03/01/2022    TRIG 209 (H) 02/09/2021    HDL 50 03/01/2022    HDL 42 02/09/2021    LDLCALC 172.2 (H) 03/01/2022    LDLCALC 166.2 (H) 02/09/2021    NONHDLCHOL 215 03/01/2022    NONHDLCHOL 208 02/09/2021    AST 25 03/01/2022    ALT 25 03/01/2022     The 10-year ASCVD risk score (Analia CROWE Jr., et al., 2013) is: 14%    Values used to calculate the score:      Age: 62 years      Sex: Male      Is Non- : No      Diabetic: No      Tobacco smoker: No      Systolic Blood Pressure: 124 mmHg      Is BP treated: Yes      HDL Cholesterol: 50 mg/dL      Total Cholesterol: 265 mg/dL

## 2022-06-01 NOTE — PATIENT INSTRUCTIONS
"I strongly recommend that you get your COVID-19 vaccine booster. I've already had mine.    The booster shot is necessary to be considered "fully vaccinated" against COVID-19. People who are fully vaccinated are much better protected from severe illness from COVID-19 than people who are unvaccinated or not fully vaccinated.    If your primary COVID-19 vaccine series was Pfizer, you should get a Pfizer booster.    You can learn more about the COVID-19 vaccine and booster shot options at [email or web address].    The quickest and easiest way to schedule an appointment for your COVID-19 vaccination booster is from the [employer] [facility] or [facility] online at [email or web address]. You can also schedule an appointment for your COVID-19 vaccination by calling [phone].    Please take care of yourself. You are important to me.   "

## 2022-06-01 NOTE — PROGRESS NOTES
WELLNESS VISIT (PREVENTIVE SERVICES)  6/1/22  8:30 AM CDT    HEALTH MAINTENANCE INTERVENTIONS - UP TO DATE  Health Maintenance Topics with due status: Not Due       Topic Last Completion Date    Colorectal Cancer Screening 11/07/2017    Influenza Vaccine 01/07/2021    Lipid Panel 03/01/2022       HEALTH MAINTENANCE INTERVENTIONS - DUE OR DUE SOON  Health Maintenance Due   Topic Date Due    TETANUS VACCINE  Never done    Shingles Vaccine (1 of 2) Never done    COVID-19 Vaccine (3 - Booster for Pfizer series) 09/07/2021    PROSTATE-SPECIFIC ANTIGEN  02/09/2022       CHIEF COMPLAINT: Annual Wellness Visit (Preventive Services)    DIAGNOSES SPECIFICALLY EVALUATED AND TREATED THIS ENCOUNTER  1. Primary hypertension  - hydroCHLOROthiazide (HYDRODIURIL) 25 MG tablet; Take 1 tablet (25 mg total) by mouth once daily.  Dispense: 90 tablet; Refill: 2  - amlodipine-valsartan (EXFORGE)  mg per tablet; Take 1 tablet by mouth once daily.  Dispense: 90 tablet; Refill: 2    2. Chronic kidney disease, stage 3a  - Renal Function Panel; Future    3. Mixed hyperlipidemia  - rosuvastatin (CRESTOR) 20 MG tablet; Take 1 tablet (20 mg total) by mouth every evening. (For cholesterol and heart health)  Dispense: 90 tablet; Refill: 3  - Lipid Panel; Future  - AST (SGOT); Future  - ALT (SGPT); Future    4. Hyperuricemia    5. Obstructive sleep apnea syndrome    6. Hypercalcemia  - Renal Function Panel; Future    7. Fatty liver  - AST (SGOT); Future  - ALT (SGPT); Future    8. Screening PSA (prostate specific antigen)  - PSA, Screening; Future    9. Need for diphtheria-tetanus-pertussis (Tdap) vaccine    10. Need for COVID-19 vaccine    11. Need for shingles vaccine    Additional education/instructions were reviewed with him. Refer to after visit summary.   Follow up in about 28 weeks (around 12/14/2022) for review test results, discuss treatment plan, re-evaluate problem(s) discussed today.   Future Appointments   Date Time Provider  Department [place]   12/12/2022  7:35 AM LABORATORY, [facility] LAB [place]   12/15/2022  7:30 AM RACHEL Haile MD [facility]     Problem List Items Addressed This Visit     Chronic kidney disease, stage 3a (Chronic)    Current Assessment & Plan     Lab Results   Component Value Date    ESTGFRAFRICA >60.0 03/01/2022    ESTGFRAFRICA >60.0 02/09/2021    ESTGFRAFRICA 57.7 (A) 01/28/2020    EGFRNONAA >60.0 03/01/2022    EGFRNONAA 53.8 (A) 02/09/2021    EGFRNONAA 49.9 (A) 01/28/2020    CREATININE 1.2 03/01/2022    CREATININE 1.4 02/09/2021    CREATININE 1.5 (H) 01/28/2020    BUN 18 03/01/2022    BUN 15 02/09/2021    BUN 18 01/28/2020               Relevant Orders    Renal Function Panel    Fatty liver (Chronic)    Overview     US RETROPERITONEAL COMPLETE 03/01/2022  FINDINGS: Incidentally noted hepatic steatosis.           Current Assessment & Plan     Lab Results   Component Value Date    AST 25 03/01/2022    AST 31 02/09/2021    AST 25 01/28/2020    ALT 25 03/01/2022    ALT 29 02/09/2021    ALT 24 01/28/2020   He declined any further evaluation and management of this condition. I instructed him to let me know if he changes his mind.           Relevant Orders    AST (SGOT)    ALT (SGPT)    Hypercalcemia (Chronic)    Current Assessment & Plan     Lab Results   Component Value Date    CALCIUM 10.7 (H) 03/01/2022    CALCIUM 9.9 02/09/2021    CALCIUM 10.2 01/28/2020    CALCIUM 9.9 11/15/2018    CALCIUM 9.9 11/08/2017     Lab Results   Component Value Date    PHOS 2.4 (L) 03/01/2022    ALBUMIN 4.8 03/01/2022    ALBUMIN 4.5 02/09/2021     Lab Results   Component Value Date    PTH 45.5 03/01/2022     No results found for: URTIMEDVOL, UCD, LABCALC, [id number], CAURMGSPEC            Relevant Orders    Renal Function Panel    Hyperuricemia (Chronic)    Current Assessment & Plan     Lab Results   Component Value Date    URICACID 6.0 03/01/2022    URICACID 7.3 (H) 02/09/2021    URICACID 6.0 11/15/2018     ESTGFRAFRICA >60.0 03/01/2022    EGFRNONAA >60.0 03/01/2022    CREATININE 1.2 03/01/2022    BUN 18 03/01/2022               Mixed hyperlipidemia (Chronic)    Current Assessment & Plan     Lab Results   Component Value Date    CHOL 265 (H) 03/01/2022    CHOL 250 (H) 02/09/2021    TRIG 214 (H) 03/01/2022    TRIG 209 (H) 02/09/2021    HDL 50 03/01/2022    HDL 42 02/09/2021    LDLCALC 172.2 (H) 03/01/2022    LDLCALC 166.2 (H) 02/09/2021    NONHDLCHOL 215 03/01/2022    NONHDLCHOL 208 02/09/2021    AST 25 03/01/2022    ALT 25 03/01/2022     The 10-year ASCVD risk score (Analia CROWE Jr., et al., 2013) is: 14%    Values used to calculate the score:      Age: 62 years      Sex: Male      Is Non- : No      Diabetic: No      Tobacco smoker: No      Systolic Blood Pressure: 124 mmHg      Is BP treated: Yes      HDL Cholesterol: 50 mg/dL      Total Cholesterol: 265 mg/dL            Relevant Medications    rosuvastatin (CRESTOR) 20 MG tablet    Other Relevant Orders    Lipid Panel    AST (SGOT)    ALT (SGPT)    Obstructive sleep apnea syndrome (Chronic)    Current Assessment & Plan     He gave informed refusal for any further evaluation and management of this condition. I instructed him to let me know if he changes his mind.           Primary hypertension - Primary (Chronic)    Current Assessment & Plan     BP Readings from Last 6 Encounters:   06/01/22 124/82   03/15/22 136/84   03/01/22 (!) 144/80   02/09/21 126/86   01/28/20 130/82   11/15/18 (!) 142/100      Last 5 Patient Entered Readings                Current 30 Day Average: 132/80  Recent Readings 6/1/2022 5/31/2022 5/31/2022 5/31/2022 5/31/2022   SBP (mmHg) 111 130 119 131 136   DBP (mmHg) 72 81 74 79 85   Pulse 100 71 68 69 72                 Lab Results   Component Value Date    ESTGFRAFRICA >60.0 03/01/2022    EGFRNONAA >60.0 03/01/2022    CREATININE 1.2 03/01/2022    BUN 18 03/01/2022    K 3.6 03/01/2022     03/01/2022    CL 99 03/01/2022      Results for orders placed or performed during the hospital encounter of 03/01/22   SCHEDULED EKG 12-LEAD (to Muse)    Collection Time: 03/01/22 12:33 PM    Narrative    Test Reason : I10,    Vent. Rate : 063 BPM     Atrial Rate : 063 BPM     P-R Int : 146 ms          QRS Dur : 092 ms      QT Int : 380 ms       P-R-T Axes : 038 009 014 degrees     QTc Int : 388 ms    Normal sinus rhythm  Normal ECG  No previous ECGs available  Confirmed by CARL ALMANZA MD ([id number]) on 3/1/2022 9:49:36 PM    Referred By: RACHEL DE LA TORRE           Confirmed By:CARL ALMANZA MD                Relevant Medications    hydroCHLOROthiazide (HYDRODIURIL) 25 MG tablet    amlodipine-valsartan (EXFORGE)  mg per tablet      Other Visit Diagnoses     Screening PSA (prostate specific antigen)        Relevant Orders    PSA, Screening    Need for diphtheria-tetanus-pertussis (Tdap) vaccine        Need for COVID-19 vaccine        Need for shingles vaccine          Unless noted herein, any chronic conditions are represented as and appear compensated/controlled and stable, and no other significant complaints or concerns were reported.    PRESCRIPTION DRUG MANAGEMENT  Outpatient Medications Prior to Visit   Medication Sig Dispense Refill    omeprazole (PRILOSEC) 20 MG capsule Take 20 mg by mouth once daily.      rivaroxaban (XARELTO) 10 mg Tab Take 10 mg by mouth daily with dinner or evening meal.      amlodipine-valsartan (EXFORGE)  mg per tablet Take 1 tablet by mouth once daily. 90 tablet 0    fenofibrate (TRICOR) 54 MG tablet Take 1 tablet (54 mg total) by mouth once daily. 90 tablet 3    hydroCHLOROthiazide (HYDRODIURIL) 25 MG tablet Take 1 tablet (25 mg total) by mouth once daily. 90 tablet 0    varicella-zoster gE-AS01B, PF, (SHINGRIX) 50 mcg/0.5 mL injection Inject 0.5 mL (one dose) into muscle now; schedule second dose  days later (Patient not taking: Reported on 6/1/2022) 1 each 1     No facility-administered medications  prior to visit.     Medications Discontinued During This Encounter   Medication Reason    fenofibrate (TRICOR) 54 MG tablet Alternate therapy    hydroCHLOROthiazide (HYDRODIURIL) 25 MG tablet Reorder    amlodipine-valsartan (EXFORGE)  mg per tablet Reorder     Medications Ordered This Encounter   Medications    amlodipine-valsartan (EXFORGE)  mg per tablet     Sig: Take 1 tablet by mouth once daily.     Dispense:  90 tablet     Refill:  2    hydroCHLOROthiazide (HYDRODIURIL) 25 MG tablet     Sig: Take 1 tablet (25 mg total) by mouth once daily.     Dispense:  90 tablet     Refill:  2    rosuvastatin (CRESTOR) 20 MG tablet     Sig: Take 1 tablet (20 mg total) by mouth every evening. (For cholesterol and heart health)     Dispense:  90 tablet     Refill:  3     PHYSICAL EXAM  Vitals:    06/01/22 0826   BP: 124/82   BP Location: Left arm   Patient Position: Sitting   BP Method: Large (Manual)   Pulse: 83   Temp: 98.3 °F (36.8 °C)   TempSrc: Tympanic   SpO2: 98%   Weight: 90.9 kg (200 lb 6.4 oz)   CONSTITUTIONAL: Vital signs noted. No apparent distress. Does not appear acutely ill or septic. Appears adequately hydrated.  PULM: Lungs clear. Breathing unlabored.  HEART: Regular.  DERM: Skin warm and moist with normal turgor.  NEURO: There are no gross focal motor deficits.  PSYCHIATRIC: Alert and conversant. Mood grossly neutral. Judgment and insight grossly intact.    PAST MEDICAL HISTORY  Darek has a past medical history of Gout attack, Hypertension, Hyperuricemia, and Primary hypertension.    SURGICAL HISTORY  Darek has a past surgical history that includes Ankle surgery; Ganglion cyst excision; Vasectomy; and Colonoscopy (N/A, 11/7/2017).    FAMILY HISTORY  Darek family history includes Dementia in his mother; Hypertension in his father and mother; Kidney failure in his mother; Restless legs syndrome in his father.     ALLERGIES  Review of patient's allergies indicates:  No Known Allergies    SOCIAL  "HISTORY  Darek  reports that he has never smoked. He has never used smokeless tobacco. He reports current alcohol use. He reports that he does not use drugs.     Documentation entered by me for this encounter may have been done in part using speech-recognition technology. Although I have made an effort to ensure accuracy, "sound like" errors may exist and should be interpreted in context.   "

## 2022-06-01 NOTE — ASSESSMENT & PLAN NOTE
Lab Results   Component Value Date    AST 25 03/01/2022    AST 31 02/09/2021    AST 25 01/28/2020    ALT 25 03/01/2022    ALT 29 02/09/2021    ALT 24 01/28/2020   He declined any further evaluation and management of this condition. I instructed him to let me know if he changes his mind.

## 2022-12-12 ENCOUNTER — LAB VISIT (OUTPATIENT)
Dept: LAB | Facility: HOSPITAL | Age: 62
End: 2022-12-12
Attending: FAMILY MEDICINE
Payer: COMMERCIAL

## 2022-12-12 DIAGNOSIS — E83.52 HYPERCALCEMIA: Chronic | ICD-10-CM

## 2022-12-12 DIAGNOSIS — K76.0 FATTY LIVER: Chronic | ICD-10-CM

## 2022-12-12 DIAGNOSIS — E78.2 MIXED HYPERLIPIDEMIA: Chronic | ICD-10-CM

## 2022-12-12 DIAGNOSIS — Z12.5 SCREENING PSA (PROSTATE SPECIFIC ANTIGEN): ICD-10-CM

## 2022-12-12 DIAGNOSIS — N18.31 CHRONIC KIDNEY DISEASE, STAGE 3A: ICD-10-CM

## 2022-12-12 LAB
ALBUMIN SERPL BCP-MCNC: 4.4 G/DL (ref 3.5–5.2)
ALT SERPL W/O P-5'-P-CCNC: 21 U/L (ref 10–44)
ANION GAP SERPL CALC-SCNC: 13 MMOL/L (ref 8–16)
AST SERPL-CCNC: 25 U/L (ref 10–40)
BUN SERPL-MCNC: 14 MG/DL (ref 8–23)
CALCIUM SERPL-MCNC: 10.4 MG/DL (ref 8.7–10.5)
CHLORIDE SERPL-SCNC: 99 MMOL/L (ref 95–110)
CHOLEST SERPL-MCNC: 146 MG/DL (ref 120–199)
CHOLEST/HDLC SERPL: 3.3 {RATIO} (ref 2–5)
CO2 SERPL-SCNC: 29 MMOL/L (ref 23–29)
COMPLEXED PSA SERPL-MCNC: 2.1 NG/ML (ref 0–4)
CREAT SERPL-MCNC: 1.2 MG/DL (ref 0.5–1.4)
EST. GFR  (NO RACE VARIABLE): >60 ML/MIN/1.73 M^2
GLUCOSE SERPL-MCNC: 102 MG/DL (ref 70–110)
HDLC SERPL-MCNC: 44 MG/DL (ref 40–75)
HDLC SERPL: 30.1 % (ref 20–50)
LDLC SERPL CALC-MCNC: 66.4 MG/DL (ref 63–159)
NONHDLC SERPL-MCNC: 102 MG/DL
PHOSPHATE SERPL-MCNC: 2.2 MG/DL (ref 2.7–4.5)
POTASSIUM SERPL-SCNC: 3.6 MMOL/L (ref 3.5–5.1)
SODIUM SERPL-SCNC: 141 MMOL/L (ref 136–145)
TRIGL SERPL-MCNC: 178 MG/DL (ref 30–150)

## 2022-12-12 PROCEDURE — 36415 COLL VENOUS BLD VENIPUNCTURE: CPT | Performed by: FAMILY MEDICINE

## 2022-12-12 PROCEDURE — 80069 RENAL FUNCTION PANEL: CPT | Performed by: FAMILY MEDICINE

## 2022-12-12 PROCEDURE — 80061 LIPID PANEL: CPT | Performed by: FAMILY MEDICINE

## 2022-12-12 PROCEDURE — 84153 ASSAY OF PSA TOTAL: CPT | Performed by: FAMILY MEDICINE

## 2022-12-12 PROCEDURE — 84450 TRANSFERASE (AST) (SGOT): CPT | Performed by: FAMILY MEDICINE

## 2022-12-12 PROCEDURE — 84460 ALANINE AMINO (ALT) (SGPT): CPT | Performed by: FAMILY MEDICINE

## 2022-12-15 ENCOUNTER — OFFICE VISIT (OUTPATIENT)
Dept: INTERNAL MEDICINE | Facility: CLINIC | Age: 62
End: 2022-12-15
Payer: COMMERCIAL

## 2022-12-15 ENCOUNTER — HOSPITAL ENCOUNTER (OUTPATIENT)
Dept: PREADMISSION TESTING | Facility: HOSPITAL | Age: 62
Discharge: HOME | End: 2022-12-15
Attending: FAMILY MEDICINE
Payer: COMMERCIAL

## 2022-12-15 VITALS
WEIGHT: 209.69 LBS | OXYGEN SATURATION: 98 % | BODY MASS INDEX: 29.35 KG/M2 | HEIGHT: 71 IN | TEMPERATURE: 98 F | DIASTOLIC BLOOD PRESSURE: 60 MMHG | HEART RATE: 78 BPM | SYSTOLIC BLOOD PRESSURE: 136 MMHG

## 2022-12-15 DIAGNOSIS — I10 PRIMARY HYPERTENSION: Chronic | ICD-10-CM

## 2022-12-15 DIAGNOSIS — N18.31 CHRONIC KIDNEY DISEASE, STAGE 3A: Chronic | ICD-10-CM

## 2022-12-15 DIAGNOSIS — Z23 NEED FOR SHINGLES VACCINE: ICD-10-CM

## 2022-12-15 DIAGNOSIS — Z23 NEED FOR INFLUENZA VACCINATION: ICD-10-CM

## 2022-12-15 DIAGNOSIS — Z12.12 SCREENING FOR COLORECTAL CANCER: ICD-10-CM

## 2022-12-15 DIAGNOSIS — E79.0 HYPERURICEMIA: Chronic | ICD-10-CM

## 2022-12-15 DIAGNOSIS — K63.5 POLYP OF COLON, UNSPECIFIED PART OF COLON, UNSPECIFIED TYPE: ICD-10-CM

## 2022-12-15 DIAGNOSIS — I82.402 ACUTE DEEP VEIN THROMBOSIS (DVT) OF LEFT LOWER EXTREMITY, UNSPECIFIED VEIN: Chronic | ICD-10-CM

## 2022-12-15 DIAGNOSIS — E78.2 MIXED HYPERLIPIDEMIA: Chronic | ICD-10-CM

## 2022-12-15 DIAGNOSIS — Z12.11 SCREENING FOR COLORECTAL CANCER: ICD-10-CM

## 2022-12-15 DIAGNOSIS — Z23 NEED FOR DIPHTHERIA-TETANUS-PERTUSSIS (TDAP) VACCINE: ICD-10-CM

## 2022-12-15 DIAGNOSIS — Z00.00 PREVENTATIVE HEALTH CARE: Primary | ICD-10-CM

## 2022-12-15 DIAGNOSIS — Z23 NEED FOR PNEUMOCOCCAL VACCINE: ICD-10-CM

## 2022-12-15 DIAGNOSIS — Z23 NEED FOR COVID-19 VACCINE: ICD-10-CM

## 2022-12-15 DIAGNOSIS — E83.52 HYPERCALCEMIA: Chronic | ICD-10-CM

## 2022-12-15 DIAGNOSIS — E66.3 OVERWEIGHT (BMI 25.0-29.9): Chronic | ICD-10-CM

## 2022-12-15 DIAGNOSIS — E83.39 HYPOPHOSPHATEMIA: Chronic | ICD-10-CM

## 2022-12-15 DIAGNOSIS — K76.0 FATTY LIVER: Chronic | ICD-10-CM

## 2022-12-15 PROCEDURE — 90677 PNEUMOCOCCAL CONJUGATE VACCINE 20-VALENT: ICD-10-PCS | Mod: S$GLB,,, | Performed by: FAMILY MEDICINE

## 2022-12-15 PROCEDURE — 90472 IMMUNIZATION ADMIN EACH ADD: CPT | Mod: S$GLB,,, | Performed by: FAMILY MEDICINE

## 2022-12-15 PROCEDURE — 90472 PNEUMOCOCCAL CONJUGATE VACCINE 20-VALENT: ICD-10-PCS | Mod: S$GLB,,, | Performed by: FAMILY MEDICINE

## 2022-12-15 PROCEDURE — 90471 FLU VACCINE (QUAD) GREATER THAN OR EQUAL TO 3YO PRESERVATIVE FREE IM: ICD-10-PCS | Mod: S$GLB,,, | Performed by: FAMILY MEDICINE

## 2022-12-15 PROCEDURE — 90471 IMMUNIZATION ADMIN: CPT | Mod: S$GLB,,, | Performed by: FAMILY MEDICINE

## 2022-12-15 PROCEDURE — 99999 PR PBB SHADOW E&M-EST. PATIENT-LVL IV: ICD-10-PCS | Mod: PBBFAC,,, | Performed by: FAMILY MEDICINE

## 2022-12-15 PROCEDURE — 99396 PREV VISIT EST AGE 40-64: CPT | Mod: 25,S$GLB,, | Performed by: FAMILY MEDICINE

## 2022-12-15 PROCEDURE — 99396 PR PREVENTIVE VISIT,EST,40-64: ICD-10-PCS | Mod: 25,S$GLB,, | Performed by: FAMILY MEDICINE

## 2022-12-15 PROCEDURE — 90686 IIV4 VACC NO PRSV 0.5 ML IM: CPT | Mod: S$GLB,,, | Performed by: FAMILY MEDICINE

## 2022-12-15 PROCEDURE — 90686 FLU VACCINE (QUAD) GREATER THAN OR EQUAL TO 3YO PRESERVATIVE FREE IM: ICD-10-PCS | Mod: S$GLB,,, | Performed by: FAMILY MEDICINE

## 2022-12-15 PROCEDURE — 99999 PR PBB SHADOW E&M-EST. PATIENT-LVL IV: CPT | Mod: PBBFAC,,, | Performed by: FAMILY MEDICINE

## 2022-12-15 PROCEDURE — 90677 PCV20 VACCINE IM: CPT | Mod: S$GLB,,, | Performed by: FAMILY MEDICINE

## 2022-12-15 RX ORDER — AMLODIPINE AND VALSARTAN 10; 320 MG/1; MG/1
1 TABLET ORAL DAILY
Qty: 90 TABLET | Refills: 2 | Status: SHIPPED | OUTPATIENT
Start: 2022-12-15 | End: 2023-06-15 | Stop reason: SDUPTHER

## 2022-12-15 RX ORDER — ROSUVASTATIN CALCIUM 20 MG/1
20 TABLET, COATED ORAL NIGHTLY
Qty: 90 TABLET | Refills: 3 | Status: SHIPPED | OUTPATIENT
Start: 2022-12-15 | End: 2023-06-15 | Stop reason: SDUPTHER

## 2022-12-15 RX ORDER — HYDROCHLOROTHIAZIDE 25 MG/1
25 TABLET ORAL DAILY
Qty: 90 TABLET | Refills: 2 | Status: SHIPPED | OUTPATIENT
Start: 2022-12-15 | End: 2023-06-15 | Stop reason: SDUPTHER

## 2022-12-15 NOTE — ASSESSMENT & PLAN NOTE
Lab Results   Component Value Date    CALCIUM 10.4 12/12/2022    CALCIUM 10.7 (H) 03/01/2022    CALCIUM 9.9 02/09/2021    CALCIUM 10.2 01/28/2020    CALCIUM 9.9 11/15/2018     Lab Results   Component Value Date    PHOS 2.2 (L) 12/12/2022    PHOS 2.4 (L) 03/01/2022    ALBUMIN 4.4 12/12/2022    ALBUMIN 4.8 03/01/2022     Lab Results   Component Value Date    PTH 45.5 03/01/2022     No results found for: URTIMEDVOL, UCD, LABCALC, [id number], CAURMGSPEC

## 2022-12-15 NOTE — PROGRESS NOTES
WELLNESS VISIT (PREVENTIVE SERVICES)  12/15/22  7:30 AM CST    REASON FOR VISIT: Annual Wellness Visit (Preventive Services)    Much improved with therapeutic lifestyle changes and rosuvastatin.    DIAGNOSES  1. Preventative health care    2. Mixed hyperlipidemia    3. Primary hypertension    4. Chronic kidney disease, stage 3a    5. Hypercalcemia    6. Fatty liver    7. Hyperuricemia    8. Overweight (BMI 25.0-29.9)    9. Hypophosphatemia    10. Acute deep vein thrombosis (DVT) of left lower extremity, unspecified vein    11. Polyp of colon, unspecified part of colon, unspecified type    12. Screening for colorectal cancer    13. Need for influenza vaccination    14. Need for pneumococcal vaccine    15. Need for shingles vaccine    16. Need for COVID-19 vaccine    17. Need for diphtheria-tetanus-pertussis (Tdap) vaccine      ORDERS SUMMARY  Orders Placed This Encounter    Pneumococcal Conjugate Vaccine (20 Valent) (IM)    Phosphorus, urine, timed    Comprehensive Metabolic Panel    PHOSPHORUS    Magnesium    LIPID PANEL    URIC ACID    Ambulatory referral/consult to Endo Procedure     amlodipine-valsartan (EXFORGE)  mg per tablet    hydroCHLOROthiazide (HYDRODIURIL) 25 MG tablet    rosuvastatin (CRESTOR) 20 MG tablet     Follow up in about 6 months (around 6/15/2023) for review test results, discuss treatment plan, re-evaluate problem(s) discussed today.   Schedule fasting labs in 6 months and appointment with me a few days later.    ASSESSMENT & PLAN  Problem List Items Addressed This Visit       Primary hypertension (Chronic)    Current Assessment & Plan     BP Readings from Last 6 Encounters:   12/15/22 136/60   06/01/22 124/82   03/15/22 136/84   03/01/22 (!) 144/80   02/09/21 126/86   01/28/20 130/82      Last 5 Patient Entered Readings                Current 30 Day Average: 132/84  Recent Readings 12/14/2022 12/13/2022 12/13/2022 12/8/2022 12/8/2022   SBP (mmHg) 115 152 167 118 137   DBP (mmHg)  77 88 90 82 81   Pulse 98 66 69 85 92                 Lab Results   Component Value Date    EGFRNORACEVR >60.0 12/12/2022    CREATININE 1.2 12/12/2022    BUN 14 12/12/2022    K 3.6 12/12/2022     12/12/2022    CL 99 12/12/2022     Results for orders placed or performed during the hospital encounter of 03/01/22   SCHEDULED EKG 12-LEAD (to Muse)    Collection Time: 03/01/22 12:33 PM    Narrative    Test Reason : I10,    Vent. Rate : 063 BPM     Atrial Rate : 063 BPM     P-R Int : 146 ms          QRS Dur : 092 ms      QT Int : 380 ms       P-R-T Axes : 038 009 014 degrees     QTc Int : 388 ms    Normal sinus rhythm  Normal ECG  No previous ECGs available  Confirmed by CARL ALMANZA MD ([id number]) on 3/1/2022 9:49:36 PM    Referred By: RACHEL DE LA TORRE           Confirmed By:CARL ALMANZA MD              Relevant Medications    amlodipine-valsartan (EXFORGE)  mg per tablet    hydroCHLOROthiazide (HYDRODIURIL) 25 MG tablet    Other Relevant Orders    Comprehensive Metabolic Panel    LIPID PANEL    Hyperuricemia (Chronic)    Current Assessment & Plan     Lab Results   Component Value Date    URICACID 6.0 03/01/2022    URICACID 7.3 (H) 02/09/2021    URICACID 6.0 11/15/2018    EGFRNORACEVR >60.0 12/12/2022    CREATININE 1.2 12/12/2022    BUN 14 12/12/2022             Relevant Orders    URIC ACID    Mixed hyperlipidemia (Chronic)    Current Assessment & Plan     Lab Results   Component Value Date    CHOL 146 12/12/2022    CHOL 265 (H) 03/01/2022    TRIG 178 (H) 12/12/2022    TRIG 214 (H) 03/01/2022    HDL 44 12/12/2022    HDL 50 03/01/2022    LDLCALC 66.4 12/12/2022    LDLCALC 172.2 (H) 03/01/2022    NONHDLCHOL 102 12/12/2022    NONHDLCHOL 215 03/01/2022    AST 25 12/12/2022    ALT 21 12/12/2022   The 10-year ASCVD risk score (Chelsey CAMARENA, et al., 2019) is: 9.4%    Values used to calculate the score:      Age: 62 years      Sex: Male      Is Non- : No      Diabetic: No      Tobacco smoker: No       Systolic Blood Pressure: 124 mmHg      Is BP treated: Yes      HDL Cholesterol: 44 mg/dL      Total Cholesterol: 146 mg/dL           Relevant Medications    rosuvastatin (CRESTOR) 20 MG tablet    Other Relevant Orders    Comprehensive Metabolic Panel    LIPID PANEL    Chronic kidney disease, stage 3a (Chronic)    Current Assessment & Plan     Lab Results   Component Value Date    EGFRNORACEVR >60.0 12/12/2022    CREATININE 1.2 12/12/2022    CREATININE 1.2 03/01/2022    CREATININE 1.4 02/09/2021    BUN 14 12/12/2022    BUN 18 03/01/2022    BUN 15 02/09/2021             Relevant Orders    Comprehensive Metabolic Panel    Acute deep vein thrombosis (DVT) of left lower extremity (Chronic)    Current Assessment & Plan     Per vascular: Continuing Xarelto for now.         Hypercalcemia (Chronic)    Current Assessment & Plan     Lab Results   Component Value Date    CALCIUM 10.4 12/12/2022    CALCIUM 10.7 (H) 03/01/2022    CALCIUM 9.9 02/09/2021    CALCIUM 10.2 01/28/2020    CALCIUM 9.9 11/15/2018     Lab Results   Component Value Date    PHOS 2.2 (L) 12/12/2022    PHOS 2.4 (L) 03/01/2022    ALBUMIN 4.4 12/12/2022    ALBUMIN 4.8 03/01/2022     Lab Results   Component Value Date    PTH 45.5 03/01/2022   No results found for: URTIMEDVOL, UCD, LABCALC, [id number], CAURMGSPEC          Fatty liver (Chronic)    Overview     US RETROPERITONEAL COMPLETE 03/01/2022  FINDINGS: Incidentally noted hepatic steatosis.         Current Assessment & Plan     Lab Results   Component Value Date    AST 25 12/12/2022    AST 25 03/01/2022    AST 31 02/09/2021    ALT 21 12/12/2022    ALT 25 03/01/2022    ALT 29 02/09/2021             Overweight (BMI 25.0-29.9) (Chronic)    Current Assessment & Plan     Wt Readings from Last 6 Encounters:   12/15/22 95.1 kg (209 lb 10.5 oz)   06/01/22 90.9 kg (200 lb 6.4 oz)   03/01/22 92.6 kg (204 lb 2.3 oz)   02/09/21 94 kg (207 lb 3.7 oz)   01/28/20 94.3 kg (207 lb 14.3 oz)   11/15/18 95.7 kg (210 lb 15.7  "oz)   Estimated body mass index is 29.24 kg/m² as calculated from the following:    Height as of this encounter: 5' 11" (1.803 m).    Weight as of this encounter: 95.1 kg (209 lb 10.5 oz).    Therapeutic lifestyle changes encouraged.          Hypophosphatemia (Chronic)    Relevant Orders    Phosphorus, urine, timed    Comprehensive Metabolic Panel    PHOSPHORUS    Magnesium    Colon polyp    Relevant Orders    Ambulatory referral/consult to Endo Procedure      Other Visit Diagnoses       Preventative health care    -  Primary    Relevant Orders    Phosphorus, urine, timed    Comprehensive Metabolic Panel    PHOSPHORUS    Magnesium    LIPID PANEL    URIC ACID    Screening for colorectal cancer        Relevant Orders    Ambulatory referral/consult to Endo Procedure     Need for influenza vaccination        Need for pneumococcal vaccine        Relevant Orders    Pneumococcal Conjugate Vaccine (20 Valent) (IM)    Need for shingles vaccine        Need for COVID-19 vaccine        Need for diphtheria-tetanus-pertussis (Tdap) vaccine              Unless noted herein, any chronic conditions are represented as and appear compensated/controlled and stable, and no other significant complaints or concerns were reported.    PRESCRIPTION DRUG MANAGEMENT  Outpatient Medications Prior to Visit   Medication Sig Dispense Refill    omeprazole (PRILOSEC) 20 MG capsule Take 20 mg by mouth once daily.      rivaroxaban (XARELTO) 10 mg Tab Take 10 mg by mouth daily with dinner or evening meal.      varicella-zoster gE-AS01B, PF, (SHINGRIX) 50 mcg/0.5 mL injection Inject 0.5 mL (one dose) into muscle now; schedule second dose  days later 1 each 1    amlodipine-valsartan (EXFORGE)  mg per tablet Take 1 tablet by mouth once daily. 90 tablet 2    hydroCHLOROthiazide (HYDRODIURIL) 25 MG tablet Take 1 tablet (25 mg total) by mouth once daily. 90 tablet 2    rosuvastatin (CRESTOR) 20 MG tablet Take 1 tablet (20 mg " "total) by mouth every evening. (For cholesterol and heart health) 90 tablet 3     No facility-administered medications prior to visit.     Medications Discontinued During This Encounter   Medication Reason    rosuvastatin (CRESTOR) 20 MG tablet Reorder    hydroCHLOROthiazide (HYDRODIURIL) 25 MG tablet Reorder    amlodipine-valsartan (EXFORGE)  mg per tablet Reorder     Medications Ordered This Encounter   Medications    amlodipine-valsartan (EXFORGE)  mg per tablet     Sig: Take 1 tablet by mouth once daily.     Dispense:  90 tablet     Refill:  2     .    hydroCHLOROthiazide (HYDRODIURIL) 25 MG tablet     Sig: Take 1 tablet (25 mg total) by mouth once daily.     Dispense:  90 tablet     Refill:  2     .    rosuvastatin (CRESTOR) 20 MG tablet     Sig: Take 1 tablet (20 mg total) by mouth every evening. (For cholesterol and heart health)     Dispense:  90 tablet     Refill:  3     PHYSICAL EXAM  Vitals:    12/15/22 0757   BP: 136/60   BP Location: Right arm   Patient Position: Sitting   BP Method: X-Large (Manual)   Pulse: 78   Temp: 97.6 °F (36.4 °C)   TempSrc: Tympanic   SpO2: 98%   Weight: 95.1 kg (209 lb 10.5 oz)   Height: 5' 11" (1.803 m)   CONSTITUTIONAL: Vital signs noted. No apparent distress. Does not appear acutely ill or septic. Appears adequately hydrated.  PULM: Lungs clear. Breathing unlabored.  HEART: Regular. No carotid bruit.  ABDOMEN: Soft and nontender. Bowel sounds present.  DERM: Skin warm and moist with normal turgor.  NEURO: There are no gross focal motor deficits.  PSYCHIATRIC: Alert and conversant. Mood grossly neutral. Judgment and insight grossly intact.     Documentation entered by me for this encounter may have been done in part using speech-recognition technology. Although I have made an effort to ensure accuracy, "sound like" errors may exist and should be interpreted in context.   "

## 2022-12-15 NOTE — ASSESSMENT & PLAN NOTE
Lab Results   Component Value Date    AST 25 12/12/2022    AST 25 03/01/2022    AST 31 02/09/2021    ALT 21 12/12/2022    ALT 25 03/01/2022    ALT 29 02/09/2021

## 2022-12-15 NOTE — ASSESSMENT & PLAN NOTE
Lab Results   Component Value Date    URICACID 6.0 03/01/2022    URICACID 7.3 (H) 02/09/2021    URICACID 6.0 11/15/2018    EGFRNORACEVR >60.0 12/12/2022    CREATININE 1.2 12/12/2022    BUN 14 12/12/2022

## 2022-12-15 NOTE — ASSESSMENT & PLAN NOTE
Lab Results   Component Value Date    CHOL 146 12/12/2022    CHOL 265 (H) 03/01/2022    TRIG 178 (H) 12/12/2022    TRIG 214 (H) 03/01/2022    HDL 44 12/12/2022    HDL 50 03/01/2022    LDLCALC 66.4 12/12/2022    LDLCALC 172.2 (H) 03/01/2022    NONHDLCHOL 102 12/12/2022    NONHDLCHOL 215 03/01/2022    AST 25 12/12/2022    ALT 21 12/12/2022     The 10-year ASCVD risk score (Chelsey CAMARENA, et al., 2019) is: 9.4%    Values used to calculate the score:      Age: 62 years      Sex: Male      Is Non- : No      Diabetic: No      Tobacco smoker: No      Systolic Blood Pressure: 124 mmHg      Is BP treated: Yes      HDL Cholesterol: 44 mg/dL      Total Cholesterol: 146 mg/dL

## 2022-12-15 NOTE — ASSESSMENT & PLAN NOTE
BP Readings from Last 6 Encounters:   12/15/22 136/60   06/01/22 124/82   03/15/22 136/84   03/01/22 (!) 144/80   02/09/21 126/86   01/28/20 130/82      Last 5 Patient Entered Readings                Current 30 Day Average: 132/84  Recent Readings 12/14/2022 12/13/2022 12/13/2022 12/8/2022 12/8/2022   SBP (mmHg) 115 152 167 118 137   DBP (mmHg) 77 88 90 82 81   Pulse 98 66 69 85 92               Lab Results   Component Value Date    EGFRNORACEVR >60.0 12/12/2022    CREATININE 1.2 12/12/2022    BUN 14 12/12/2022    K 3.6 12/12/2022     12/12/2022    CL 99 12/12/2022     Results for orders placed or performed during the hospital encounter of 03/01/22   SCHEDULED EKG 12-LEAD (to Muse)    Collection Time: 03/01/22 12:33 PM    Narrative    Test Reason : I10,    Vent. Rate : 063 BPM     Atrial Rate : 063 BPM     P-R Int : 146 ms          QRS Dur : 092 ms      QT Int : 380 ms       P-R-T Axes : 038 009 014 degrees     QTc Int : 388 ms    Normal sinus rhythm  Normal ECG  No previous ECGs available  Confirmed by CARL ALMANZA MD ([id number]) on 3/1/2022 9:49:36 PM    Referred By: RACHEL DE LA TORRE           Confirmed By:CARL ALMANZA MD

## 2022-12-15 NOTE — ASSESSMENT & PLAN NOTE
"Wt Readings from Last 6 Encounters:   12/15/22 95.1 kg (209 lb 10.5 oz)   06/01/22 90.9 kg (200 lb 6.4 oz)   03/01/22 92.6 kg (204 lb 2.3 oz)   02/09/21 94 kg (207 lb 3.7 oz)   01/28/20 94.3 kg (207 lb 14.3 oz)   11/15/18 95.7 kg (210 lb 15.7 oz)     Estimated body mass index is 29.24 kg/m² as calculated from the following:    Height as of this encounter: 5' 11" (1.803 m).    Weight as of this encounter: 95.1 kg (209 lb 10.5 oz).    Therapeutic lifestyle changes encouraged.   "

## 2022-12-15 NOTE — PATIENT INSTRUCTIONS
I recommend that you get the new bi-valent COVID-19 vaccine booster that protects against the Omicron sub-variants of COVID-19.    People who are fully vaccinated are much better protected from severe illness (hospitalization and death) from COVID-19 than people who are unvaccinated or not fully vaccinated.    You can learn more about the COVID-19 vaccine and booster shot options at https://www.[facility].org/coronavirus/vaccine-faqs.    You can schedule an appointment for your COVID-19 booster vaccination with [facility] or by calling [phone].    Please take care of yourself. You are important to me.

## 2022-12-15 NOTE — ASSESSMENT & PLAN NOTE
Lab Results   Component Value Date    EGFRNORACEVR >60.0 12/12/2022    CREATININE 1.2 12/12/2022    CREATININE 1.2 03/01/2022    CREATININE 1.4 02/09/2021    BUN 14 12/12/2022    BUN 18 03/01/2022    BUN 15 02/09/2021

## 2023-01-04 ENCOUNTER — TELEPHONE (OUTPATIENT)
Dept: PREADMISSION TESTING | Facility: HOSPITAL | Age: 63
End: 2023-01-04
Payer: COMMERCIAL

## 2023-01-04 DIAGNOSIS — K63.5 POLYP OF COLON, UNSPECIFIED PART OF COLON, UNSPECIFIED TYPE: Primary | ICD-10-CM

## 2023-01-04 DIAGNOSIS — Z12.11 SCREEN FOR COLON CANCER: ICD-10-CM

## 2023-01-04 DIAGNOSIS — Z12.12 SCREENING FOR RECTAL CANCER: ICD-10-CM

## 2023-01-04 RX ORDER — POLYETHYLENE GLYCOL 3350, SODIUM SULFATE ANHYDROUS, SODIUM BICARBONATE, SODIUM CHLORIDE, POTASSIUM CHLORIDE 236; 22.74; 6.74; 5.86; 2.97 G/4L; G/4L; G/4L; G/4L; G/4L
4 POWDER, FOR SOLUTION ORAL ONCE
Qty: 4000 ML | Refills: 0 | Status: SHIPPED | OUTPATIENT
Start: 2023-01-04 | End: 2023-01-04

## 2023-01-05 ENCOUNTER — ANESTHESIA EVENT (OUTPATIENT)
Dept: ENDOSCOPY | Facility: HOSPITAL | Age: 63
End: 2023-01-05
Payer: COMMERCIAL

## 2023-01-05 NOTE — ANESTHESIA PREPROCEDURE EVALUATION
01/05/2023  Darek Leal is a 63 y.o., male.    Past Medical History:   Diagnosis Date    Chronic kidney disease, unspecified     stage III    DVT (deep venous thrombosis) 10/2021    left leg    Gout attack     Hypertension     Hyperuricemia 06/28/2013    Mixed hyperlipidemia     Personal history of colonic polyps     Primary hypertension 06/28/2013     Past Surgical History:   Procedure Laterality Date    ANKLE SURGERY      COLONOSCOPY N/A 11/7/2017    Procedure: COLONOSCOPY;  Surgeon: Philipp Cehn MD;  Location: [facility];  Service: Endoscopy;  Laterality: N/A;    GANGLION CYST EXCISION      VASECTOMY         Normal sinus rhythm   Normal ECG   No previous ECGs available   Confirmed by CAMI SHARMA, CARL ([id number]) on 3/1/2022     Pre-op Assessment    I have reviewed the Patient Summary Reports.     I have reviewed the Nursing Notes. I have reviewed the NPO Status.   I have reviewed the Medications.     Review of Systems  Anesthesia Hx:  No problems with previous Anesthesia  Denies Family Hx of Anesthesia complications.   Denies Personal Hx of Anesthesia complications.   Social:  Non-Smoker, Social Alcohol Use    Hematology/Oncology:  Hematology Normal   Oncology Normal     EENT/Dental:EENT/Dental Normal   Cardiovascular:   Hypertension Denies MI.  Denies CAD.     Denies Angina. hyperlipidemia ECG has been reviewed. NSR 3/1/2022, DVT history   Pulmonary:   Sleep Apnea Noncompliant w cpap   Education provided regarding risk of obstructive sleep apnea     Renal/:   CKD 3a   Hepatic/GI:   Bowel Prep. GERD Fatty liver   Musculoskeletal:  Musculoskeletal Normal    Neurological:  Neurology Normal    Endocrine:  Endocrine Normal    Dermatological:  Skin Normal    Psych:  Psychiatric Normal           Physical Exam  General: Well nourished, Cooperative, Alert and Oriented    Airway:  Mallampati: II    Mouth Opening: Small, but > 3cm  TM Distance: Normal  Tongue: Normal  Neck ROM: Normal ROM    Dental:  Intact    Chest/Lungs:  Normal Respiratory Rate, Clear to auscultation    Heart:  Rate: Normal  Rhythm: Regular Rhythm        Anesthesia Plan  Type of Anesthesia, risks & benefits discussed:    Anesthesia Type: Gen Natural Airway  Intra-op Monitoring Plan: Standard ASA Monitors  Post Op Pain Control Plan: multimodal analgesia  Induction:  IV  Informed Consent: Informed consent signed with the Patient and all parties understand the risks and agree with anesthesia plan.  All questions answered. Patient consented to blood products? No  ASA Score: 3  Day of Surgery Review of History & Physical: H&P Update referred to the surgeon/provider.    Ready For Surgery From Anesthesia Perspective.     .

## 2023-01-09 ENCOUNTER — ANESTHESIA (OUTPATIENT)
Dept: ENDOSCOPY | Facility: HOSPITAL | Age: 63
End: 2023-01-09
Payer: COMMERCIAL

## 2023-01-09 ENCOUNTER — HOSPITAL ENCOUNTER (OUTPATIENT)
Facility: HOSPITAL | Age: 63
Discharge: HOME | End: 2023-01-09
Attending: INTERNAL MEDICINE | Admitting: INTERNAL MEDICINE
Payer: COMMERCIAL

## 2023-01-09 VITALS
HEIGHT: 71 IN | OXYGEN SATURATION: 98 % | BODY MASS INDEX: 28.86 KG/M2 | DIASTOLIC BLOOD PRESSURE: 86 MMHG | HEART RATE: 78 BPM | RESPIRATION RATE: 16 BRPM | TEMPERATURE: 98 F | SYSTOLIC BLOOD PRESSURE: 140 MMHG | WEIGHT: 206.13 LBS

## 2023-01-09 DIAGNOSIS — Z86.010 PERSONAL HISTORY OF COLONIC POLYPS: Primary | ICD-10-CM

## 2023-01-09 PROBLEM — Z86.0100 PERSONAL HISTORY OF COLONIC POLYPS: Status: ACTIVE | Noted: 2023-01-09

## 2023-01-09 PROCEDURE — D9220A PRA ANESTHESIA: Mod: ANES,,, | Performed by: ANESTHESIOLOGY

## 2023-01-09 PROCEDURE — 37000009 HC ANESTHESIA EA ADD 15 MINS: Performed by: INTERNAL MEDICINE

## 2023-01-09 PROCEDURE — 25000003 PHARM REV CODE 250: Performed by: INTERNAL MEDICINE

## 2023-01-09 PROCEDURE — 37000008 HC ANESTHESIA 1ST 15 MINUTES: Performed by: INTERNAL MEDICINE

## 2023-01-09 PROCEDURE — G0105 COLORECTAL SCRN; HI RISK IND: HCPCS | Mod: ,,, | Performed by: INTERNAL MEDICINE

## 2023-01-09 PROCEDURE — D9220A PRA ANESTHESIA: ICD-10-PCS | Mod: ANES,,, | Performed by: ANESTHESIOLOGY

## 2023-01-09 PROCEDURE — 25000003 PHARM REV CODE 250: Performed by: NURSE ANESTHETIST, CERTIFIED REGISTERED

## 2023-01-09 PROCEDURE — G0105 COLORECTAL SCRN; HI RISK IND: HCPCS | Performed by: INTERNAL MEDICINE

## 2023-01-09 PROCEDURE — 63600175 PHARM REV CODE 636 W HCPCS: Performed by: NURSE ANESTHETIST, CERTIFIED REGISTERED

## 2023-01-09 PROCEDURE — G0105 COLORECTAL SCRN; HI RISK IND: ICD-10-PCS | Mod: ,,, | Performed by: INTERNAL MEDICINE

## 2023-01-09 PROCEDURE — D9220A PRA ANESTHESIA: ICD-10-PCS | Mod: CRNA,,, | Performed by: NURSE ANESTHETIST, CERTIFIED REGISTERED

## 2023-01-09 PROCEDURE — 63600175 PHARM REV CODE 636 W HCPCS: Performed by: INTERNAL MEDICINE

## 2023-01-09 PROCEDURE — D9220A PRA ANESTHESIA: Mod: CRNA,,, | Performed by: NURSE ANESTHETIST, CERTIFIED REGISTERED

## 2023-01-09 RX ORDER — SODIUM CHLORIDE, SODIUM LACTATE, POTASSIUM CHLORIDE, CALCIUM CHLORIDE 600; 310; 30; 20 MG/100ML; MG/100ML; MG/100ML; MG/100ML
INJECTION, SOLUTION INTRAVENOUS CONTINUOUS
Status: DISCONTINUED | OUTPATIENT
Start: 2023-01-09 | End: 2023-06-15

## 2023-01-09 RX ORDER — LIDOCAINE HYDROCHLORIDE 20 MG/ML
INJECTION, SOLUTION EPIDURAL; INFILTRATION; INTRACAUDAL; PERINEURAL
Status: DISCONTINUED | OUTPATIENT
Start: 2023-01-09 | End: 2023-01-09

## 2023-01-09 RX ORDER — DEXTROMETHORPHAN/PSEUDOEPHED 2.5-7.5/.8
DROPS ORAL
Status: DISCONTINUED | OUTPATIENT
Start: 2023-01-09 | End: 2023-01-09 | Stop reason: HOSPADM

## 2023-01-09 RX ORDER — PROPOFOL 10 MG/ML
VIAL (ML) INTRAVENOUS
Status: DISCONTINUED | OUTPATIENT
Start: 2023-01-09 | End: 2023-01-09

## 2023-01-09 RX ADMIN — PROPOFOL 30 MG: 10 INJECTION, EMULSION INTRAVENOUS at 09:01

## 2023-01-09 RX ADMIN — PROPOFOL 20 MG: 10 INJECTION, EMULSION INTRAVENOUS at 09:01

## 2023-01-09 RX ADMIN — LIDOCAINE HYDROCHLORIDE 50 MG: 20 INJECTION, SOLUTION EPIDURAL; INFILTRATION; INTRACAUDAL; PERINEURAL at 09:01

## 2023-01-09 RX ADMIN — PROPOFOL 100 MG: 10 INJECTION, EMULSION INTRAVENOUS at 09:01

## 2023-01-09 RX ADMIN — PROPOFOL 50 MG: 10 INJECTION, EMULSION INTRAVENOUS at 09:01

## 2023-01-09 RX ADMIN — SODIUM CHLORIDE, POTASSIUM CHLORIDE, SODIUM LACTATE AND CALCIUM CHLORIDE: 600; 310; 30; 20 INJECTION, SOLUTION INTRAVENOUS at 08:01

## 2023-01-09 NOTE — PLAN OF CARE
Discharge instructions reviewed with pt and spouse, handouts given, verbalized understanding with no further questions at this time. Dr. Mcintyre spoke to pt at bedside, reviewed procedure and answered questions with MD telephone number provided per AVS sheet. VSS on RA, no pain or nausea noted, tolerating po fluids without difficulty, no other complaints noted. Fall precautions reviewed, consents in chart, PIV removed.

## 2023-01-09 NOTE — DISCHARGE SUMMARY
[facility] - End[facility] 1st Fl  Discharge Note  Short Stay    Procedure(s) (LRB):  COLONOSCOPY (N/A)      OUTCOME: Patient tolerated treatment/procedure well without complication and is now ready for discharge.    DISPOSITION: Home or Self Care    FINAL DIAGNOSIS:  Personal history of colonic polyps    FOLLOWUP: With primary care provider    DISCHARGE INSTRUCTIONS:  No discharge procedures on file.

## 2023-01-09 NOTE — H&P
Short Stay Endoscopy History and Physical    PCP - KIMMY Haile MD    Procedure - Colonoscopy  ASA - 2  Mallampati - per anesthesia  History of Anesthesia problems - no  Family history Anesthesia problems -  no     HPI:  This is a 63 y.o. male here for evaluation of :   Active Hospital Problems    Diagnosis  POA    *Personal history of colonic polyps [Z86.010]  Not Applicable      Resolved Hospital Problems   No resolved problems to display.         Health Maintenance         Date Due Completion Date    TETANUS VACCINE Never done ---    Shingles Vaccine (1 of 2) Never done ---    COVID-19 Vaccine (3 - Booster for Pfizer series) 06/02/2021 4/7/2021    Colorectal Cancer Screening 11/07/2022 11/7/2017    PROSTATE-SPECIFIC ANTIGEN 12/12/2023 12/12/2022    Override on 1/11/2013: Done    Lipid Panel 12/12/2023 12/12/2022            ROS:  CONSTITUTIONAL: Denies weight change,  fatigue, fevers, chills, night sweats.  CARDIOVASCULAR: Denies chest pain, shortness of breath, orthopnea and edema.  RESPIRATORY: Denies cough, hemoptysis, dyspnea, and wheezing.  GI: See HPI.    Medical History:   Past Medical History:   Diagnosis Date    Chronic kidney disease, unspecified     stage III    DVT (deep venous thrombosis) 10/2021    left leg    Gout attack     Hypertension     Hyperuricemia 06/28/2013    Mixed hyperlipidemia     Personal history of colonic polyps     Primary hypertension 06/28/2013       Surgical History:   Past Surgical History:   Procedure Laterality Date    ANKLE SURGERY      COLONOSCOPY N/A 11/7/2017    Procedure: COLONOSCOPY;  Surgeon: Philipp Chen MD;  Location: [facility];  Service: Endoscopy;  Laterality: N/A;    GANGLION CYST EXCISION      VASECTOMY         Family History:   Family History   Problem Relation Age of Onset    Hypertension Mother     Dementia Mother     Kidney failure Mother     Restless legs syndrome Father     Hypertension Father        Social History:   Social History     Tobacco Use     Smoking status: Never    Smokeless tobacco: Never   Substance Use Topics    Alcohol use: Yes     Comment: OCCASSIONAL    Drug use: No       Allergies:   Review of patient's allergies indicates:  No Known Allergies    Medications:   No current facility-administered medications on file prior to encounter.     Current Outpatient Medications on File Prior to Encounter   Medication Sig Dispense Refill    amlodipine-valsartan (EXFORGE)  mg per tablet Take 1 tablet by mouth once daily. 90 tablet 2    hydroCHLOROthiazide (HYDRODIURIL) 25 MG tablet Take 1 tablet (25 mg total) by mouth once daily. 90 tablet 2    omeprazole (PRILOSEC) 20 MG capsule Take 20 mg by mouth once daily.      rosuvastatin (CRESTOR) 20 MG tablet Take 1 tablet (20 mg total) by mouth every evening. (For cholesterol and heart health) 90 tablet 3    rivaroxaban (XARELTO) 10 mg Tab Take 10 mg by mouth daily with dinner or evening meal.      varicella-zoster gE-AS01B, PF, (SHINGRIX) 50 mcg/0.5 mL injection Inject 0.5 mL (one dose) into muscle now; schedule second dose  days later 1 each 1       Physical Exam:  Vital Signs:   Vitals:    01/09/23 0837   BP: (!) 167/100   Pulse: 80   Resp: 18   Temp: 97.7 °F (36.5 °C)     General Appearance: Well appearing in no acute distress  ENT: OP clear  Chest: CTA B  CV: RRR, no m/r/g  Abd: s/nt/nd/nabs  Ext: no edema    Labs:Reviewed    IMP:  Active Hospital Problems    Diagnosis  POA    *Personal history of colonic polyps [Z86.010]  Not Applicable      Resolved Hospital Problems   No resolved problems to display.         Plan:   I have explained the risks and benefits of colonoscopy to the patient including but not limited to bleeding, perforation, infection, and death. The patient wishes to proceed.

## 2023-01-09 NOTE — ANESTHESIA POSTPROCEDURE EVALUATION
Anesthesia Post Evaluation    Patient: Darek Leal    Procedure(s) Performed: Procedure(s) (LRB):  COLONOSCOPY (N/A)    Final Anesthesia Type: MAC      Patient location during evaluation: PACU  Patient participation: Yes- Able to Participate  Level of consciousness: awake and alert and oriented  Post-procedure vital signs: reviewed and stable  Pain management: adequate  Airway patency: patent    PONV status at discharge: No PONV  Anesthetic complications: no      Cardiovascular status: hemodynamically stable  Respiratory status: unassisted  Hydration status: euvolemic  Follow-up not needed.          Vitals Value Taken Time   /86 01/09/23 1004   Temp 36.6 °C (97.8 °F) 01/09/23 1004   Pulse 78 01/09/23 1004   Resp 16 01/09/23 1004   SpO2 98 % 01/09/23 1004         Event Time   Out of Recovery 10:03:00         Pain/Demetrius Score: Demetrius Score: 10 (1/9/2023 10:00 AM)

## 2023-01-09 NOTE — [employer] PATIENT INSTRUCTIONS
Discharge Summary/Instructions after an Endoscopic Procedure  Patient Name: Darek Leal  Patient MRN: [id number]  Patient YOB: 1960 Monday, January 9, 2023  Myrna Mcintyre MD  Dear patient,  As a result of recent federal legislation (The Federal Cures Act), you may   receive lab or pathology results from your procedure in your [facility]   account before your physician is able to contact you. Your physician or   their representative will relay the results to you with their   recommendations at their soonest availability.  Thank you,  RESTRICTIONS:  During your procedure today, you received medications for sedation.  These   medications may affect your judgment, balance and coordination.  Therefore,   for 24 hours, you have the following restrictions:   - DO NOT drive a car, operate machinery, make legal/financial decisions,   sign important papers or drink alcohol.    ACTIVITY:  Today: no heavy lifting, straining or running due to procedural   sedation/anesthesia.  The following day: return to full activity including work.  DIET:  Eat and drink normally unless instructed otherwise.     TREATMENT FOR COMMON SIDE EFFECTS:  - Mild abdominal pain, nausea, belching, bloating or excessive gas:  rest,   eat lightly and use a heating pad.  - Sore Throat: treat with throat lozenges and/or gargle with warm salt   water.  - Because air was used during the procedure, expelling large amounts of air   from your rectum or belching is normal.  - If a bowel prep was taken, you may not have a bowel movement for 1-3 days.    This is normal.  SYMPTOMS TO WATCH FOR AND REPORT TO YOUR PHYSICIAN:  1. Abdominal pain or bloating, other than gas cramps.  2. Chest pain.  3. Back pain.  4. Signs of infection such as: chills or fever occurring within 24 hours   after the procedure.  5. Rectal bleeding, which would show as bright red, maroon, or black stools.   (A tablespoon of blood from the rectum is not serious, especially  if   hemorrhoids are present.)  6. Vomiting.  7. Weakness or dizziness.  GO DIRECTLY TO THE NEAREST EMERGENCY ROOM IF YOU HAVE ANY OF THE FOLLOWING:      Difficulty breathing              Chills and/or fever over 101 F   Persistent vomiting and/or vomiting blood   Severe abdominal pain   Severe chest pain   Black, tarry stools   Bleeding- more than one tablespoon   Any other symptom or condition that you feel may need urgent attention  Your doctor recommends these additional instructions:  If any biopsies were taken, your doctors clinic will contact you in 1 to 2   weeks with any results.  - Discharge patient to home (via wheelchair).   - Resume previous diet.   - Continue present medications.   - Repeat colonoscopy in 5 years for surveillance.   - Patient has a contact number available for emergencies.  The signs and   symptoms of potential delayed complications were discussed with the   patient.  Return to normal activities tomorrow.  Written discharge   instructions were provided to the patient.  For questions, problems or results please call your physician Myrna Mcintyre MD at Work:  [phone]  If you have any questions about the above instructions, call the GI   department at [phone] or call the endoscopy unit at [phone]   from 7am until 3 pm.  [facility] [place], EMERGENCY ROOM PHONE NUMBER:   [phone]  IF A COMPLICATION OR EMERGENCY SITUATION ARISES AND YOU ARE UNABLE TO REACH   YOUR PHYSICIAN - GO DIRECTLY TO THE EMERGENCY ROOM.  I have read or have had read to me these discharge instructions for my   procedure and have received a written copy.  I understand these   instructions and will follow-up with my physician if I have any questions.     __________________________________       _____________________________________  Nurse Signature                                          Patient/Designated   Responsible Party Signature  MD Myrna Cordova  MD Francisco Javier  1/9/2023 9:32:37 AM  [employer]

## 2023-01-09 NOTE — TRANSFER OF CARE
"Anesthesia Transfer of Care Note    Patient: Darek Leal    Procedure(s) Performed: Procedure(s) (LRB):  COLONOSCOPY (N/A)    Patient location: PACU    Anesthesia Type: general    Transport from OR: Transported from OR on room air with adequate spontaneous ventilation    Post pain: adequate analgesia    Post assessment: no apparent anesthetic complications    Post vital signs: stable    Level of consciousness: awake    Nausea/Vomiting: no nausea/vomiting    Complications: none    Transfer of care protocol was followed      Last vitals:   Visit Vitals  BP (!) 167/100 (Patient Position: Sitting)   Pulse 77   Temp 36.5 °C (97.7 °F) (Temporal)   Resp 17   Ht 5' 11" (1.803 m)   Wt 93.5 kg (206 lb 2.1 oz)   SpO2 98%   BMI 28.75 kg/m²     "

## 2023-06-08 ENCOUNTER — LAB VISIT (OUTPATIENT)
Dept: LAB | Facility: HOSPITAL | Age: 63
End: 2023-06-08
Attending: FAMILY MEDICINE
Payer: COMMERCIAL

## 2023-06-08 DIAGNOSIS — E78.2 MIXED HYPERLIPIDEMIA: Chronic | ICD-10-CM

## 2023-06-08 DIAGNOSIS — E79.0 HYPERURICEMIA: Chronic | ICD-10-CM

## 2023-06-08 DIAGNOSIS — E83.39 HYPOPHOSPHATEMIA: Chronic | ICD-10-CM

## 2023-06-08 DIAGNOSIS — I10 PRIMARY HYPERTENSION: Chronic | ICD-10-CM

## 2023-06-08 DIAGNOSIS — Z00.00 PREVENTATIVE HEALTH CARE: ICD-10-CM

## 2023-06-08 DIAGNOSIS — N18.31 CHRONIC KIDNEY DISEASE, STAGE 3A: Chronic | ICD-10-CM

## 2023-06-08 LAB
ALBUMIN SERPL BCP-MCNC: 4.2 G/DL (ref 3.5–5.2)
ALP SERPL-CCNC: 42 U/L (ref 55–135)
ALT SERPL W/O P-5'-P-CCNC: 14 U/L (ref 10–44)
ANION GAP SERPL CALC-SCNC: 9 MMOL/L (ref 8–16)
AST SERPL-CCNC: 21 U/L (ref 10–40)
BILIRUB SERPL-MCNC: 0.7 MG/DL (ref 0.1–1)
BUN SERPL-MCNC: 18 MG/DL (ref 8–23)
CALCIUM SERPL-MCNC: 9.7 MG/DL (ref 8.7–10.5)
CHLORIDE SERPL-SCNC: 103 MMOL/L (ref 95–110)
CHOLEST SERPL-MCNC: 151 MG/DL (ref 120–199)
CHOLEST/HDLC SERPL: 4.1 {RATIO} (ref 2–5)
CO2 SERPL-SCNC: 31 MMOL/L (ref 23–29)
CREAT SERPL-MCNC: 1.4 MG/DL (ref 0.5–1.4)
EST. GFR  (NO RACE VARIABLE): 56.5 ML/MIN/1.73 M^2
GLUCOSE SERPL-MCNC: 97 MG/DL (ref 70–110)
HDLC SERPL-MCNC: 37 MG/DL (ref 40–75)
HDLC SERPL: 24.5 % (ref 20–50)
LDLC SERPL CALC-MCNC: 58.6 MG/DL (ref 63–159)
MAGNESIUM SERPL-MCNC: 1.9 MG/DL (ref 1.6–2.6)
NONHDLC SERPL-MCNC: 114 MG/DL
PHOSPHATE SERPL-MCNC: 2.4 MG/DL (ref 2.7–4.5)
POTASSIUM SERPL-SCNC: 3.5 MMOL/L (ref 3.5–5.1)
PROT SERPL-MCNC: 6.8 G/DL (ref 6–8.4)
SODIUM SERPL-SCNC: 143 MMOL/L (ref 136–145)
TRIGL SERPL-MCNC: 277 MG/DL (ref 30–150)
URATE SERPL-MCNC: 8.3 MG/DL (ref 3.4–7)

## 2023-06-08 PROCEDURE — 83735 ASSAY OF MAGNESIUM: CPT | Performed by: FAMILY MEDICINE

## 2023-06-08 PROCEDURE — 36415 COLL VENOUS BLD VENIPUNCTURE: CPT | Performed by: FAMILY MEDICINE

## 2023-06-08 PROCEDURE — 80061 LIPID PANEL: CPT | Performed by: FAMILY MEDICINE

## 2023-06-08 PROCEDURE — 84100 ASSAY OF PHOSPHORUS: CPT | Performed by: FAMILY MEDICINE

## 2023-06-08 PROCEDURE — 84105 ASSAY OF URINE PHOSPHORUS: CPT | Performed by: FAMILY MEDICINE

## 2023-06-08 PROCEDURE — 84550 ASSAY OF BLOOD/URIC ACID: CPT | Performed by: FAMILY MEDICINE

## 2023-06-08 PROCEDURE — 80053 COMPREHEN METABOLIC PANEL: CPT | Performed by: FAMILY MEDICINE

## 2023-06-09 LAB
PHOSPHATE 24H UR-MRATE: 56 MG/HR (ref 0–53.9)
PHOSPHATE UR-MCNC: 64 MG/DL
PHOSPHORUS, URINE (MG/SPEC): 1344 MG/SPEC
URINE COLLECTION DURATION: 24 HR
URINE VOLUME: 2100 ML

## 2023-06-15 ENCOUNTER — E-CONSULT (OUTPATIENT)
Dept: NEPHROLOGY | Facility: CLINIC | Age: 63
End: 2023-06-15
Payer: COMMERCIAL

## 2023-06-15 ENCOUNTER — OFFICE VISIT (OUTPATIENT)
Dept: INTERNAL MEDICINE | Facility: CLINIC | Age: 63
End: 2023-06-15
Payer: COMMERCIAL

## 2023-06-15 ENCOUNTER — PATIENT MESSAGE (OUTPATIENT)
Dept: INTERNAL MEDICINE | Facility: CLINIC | Age: 63
End: 2023-06-15

## 2023-06-15 VITALS
HEIGHT: 71 IN | SYSTOLIC BLOOD PRESSURE: 142 MMHG | WEIGHT: 203.69 LBS | TEMPERATURE: 98 F | BODY MASS INDEX: 28.52 KG/M2 | OXYGEN SATURATION: 97 % | DIASTOLIC BLOOD PRESSURE: 86 MMHG | HEART RATE: 68 BPM

## 2023-06-15 DIAGNOSIS — K76.0 FATTY LIVER: Chronic | ICD-10-CM

## 2023-06-15 DIAGNOSIS — N18.31 CHRONIC KIDNEY DISEASE, STAGE 3A: Chronic | ICD-10-CM

## 2023-06-15 DIAGNOSIS — E83.39 HYPOPHOSPHATEMIA: Chronic | ICD-10-CM

## 2023-06-15 DIAGNOSIS — E83.39 HYPOPHOSPHATEMIA: Primary | ICD-10-CM

## 2023-06-15 DIAGNOSIS — E78.2 MIXED HYPERLIPIDEMIA: Chronic | ICD-10-CM

## 2023-06-15 DIAGNOSIS — Z86.718 HISTORY OF DVT OF LOWER EXTREMITY: ICD-10-CM

## 2023-06-15 DIAGNOSIS — M1A.0720 CHRONIC IDIOPATHIC GOUT INVOLVING TOE OF LEFT FOOT WITHOUT TOPHUS: Chronic | ICD-10-CM

## 2023-06-15 DIAGNOSIS — I10 PRIMARY HYPERTENSION: Primary | Chronic | ICD-10-CM

## 2023-06-15 PROCEDURE — 99999 PR PBB SHADOW E&M-EST. PATIENT-LVL IV: CPT | Mod: PBBFAC,,, | Performed by: FAMILY MEDICINE

## 2023-06-15 PROCEDURE — 99214 PR OFFICE/OUTPT VISIT, EST, LEVL IV, 30-39 MIN: ICD-10-PCS | Mod: S$GLB,,, | Performed by: FAMILY MEDICINE

## 2023-06-15 PROCEDURE — 99449 NTRPROF PH1/NTRNET/EHR 31/>: CPT | Mod: S$GLB,,, | Performed by: INTERNAL MEDICINE

## 2023-06-15 PROCEDURE — 99999 PR PBB SHADOW E&M-EST. PATIENT-LVL IV: ICD-10-PCS | Mod: PBBFAC,,, | Performed by: FAMILY MEDICINE

## 2023-06-15 PROCEDURE — 99449 PR INTERPROF, PHONE/INTERNET/EHR, CONSULT, >= 31 MINS: ICD-10-PCS | Mod: S$GLB,,, | Performed by: INTERNAL MEDICINE

## 2023-06-15 PROCEDURE — 99214 OFFICE O/P EST MOD 30 MIN: CPT | Mod: S$GLB,,, | Performed by: FAMILY MEDICINE

## 2023-06-15 RX ORDER — HYDROCHLOROTHIAZIDE 25 MG/1
25 TABLET ORAL DAILY
Qty: 90 TABLET | Refills: 3 | Status: SHIPPED | OUTPATIENT
Start: 2023-06-15 | End: 2024-03-12 | Stop reason: SDUPTHER

## 2023-06-15 RX ORDER — ALLOPURINOL 100 MG/1
100 TABLET ORAL DAILY
Qty: 90 TABLET | Refills: 3 | Status: SHIPPED | OUTPATIENT
Start: 2023-06-15 | End: 2024-03-12 | Stop reason: SDUPTHER

## 2023-06-15 RX ORDER — ROSUVASTATIN CALCIUM 20 MG/1
20 TABLET, COATED ORAL NIGHTLY
Qty: 90 TABLET | Refills: 3 | Status: SHIPPED | OUTPATIENT
Start: 2023-06-15 | End: 2024-03-12 | Stop reason: SDUPTHER

## 2023-06-15 RX ORDER — AMLODIPINE AND VALSARTAN 10; 320 MG/1; MG/1
1 TABLET ORAL DAILY
Qty: 90 TABLET | Refills: 3 | Status: SHIPPED | OUTPATIENT
Start: 2023-06-15 | End: 2024-03-12 | Stop reason: SDUPTHER

## 2023-06-15 NOTE — ASSESSMENT & PLAN NOTE
Lab Results   Component Value Date    CHOL 151 06/08/2023    CHOL 146 12/12/2022    TRIG 277 (H) 06/08/2023    TRIG 178 (H) 12/12/2022    HDL 37 (L) 06/08/2023    HDL 44 12/12/2022    LDLCALC 58.6 (L) 06/08/2023    LDLCALC 66.4 12/12/2022    NONHDLCHOL 114 06/08/2023    NONHDLCHOL 102 12/12/2022    AST 21 06/08/2023    ALT 14 06/08/2023     The 10-year ASCVD risk score (Chelsey CAMARENA, et al., 2019) is: 14.7%    Values used to calculate the score:      Age: 63 years      Sex: Male      Is Non- : No      Diabetic: No      Tobacco smoker: No      Systolic Blood Pressure: 142 mmHg      Is BP treated: Yes      HDL Cholesterol: 37 mg/dL      Total Cholesterol: 151 mg/dL

## 2023-06-15 NOTE — ASSESSMENT & PLAN NOTE
Lab Results   Component Value Date    URICACID 8.3 (H) 06/08/2023    URICACID 6.0 03/01/2022    URICACID 7.3 (H) 02/09/2021    EGFRNORACEVR 56.5 (A) 06/08/2023    CREATININE 1.4 06/08/2023    BUN 18 06/08/2023   Patient Portal message received from patient after appointment: We talked today about starting me on a low dose medication for gout and would like it if you added it to my mail order medication. Thank you!

## 2023-06-15 NOTE — ASSESSMENT & PLAN NOTE
BP Readings from Last 6 Encounters:   06/15/23 (!) 142/86   01/09/23 (!) 140/86   12/15/22 136/60   06/01/22 124/82   03/15/22 136/84   03/01/22 (!) 144/80      Last 5 Patient Entered Readings                Current 30 Day Average: 119/75  Recent Readings 6/15/2023 6/14/2023 6/2/2023 6/1/2023 5/29/2023   SBP (mmHg) 121 108 113 124 100   DBP (mmHg) 78 64 76 79 60   Pulse 85 92 89 88 89               Lab Results   Component Value Date    EGFRNORACEVR 56.5 (A) 06/08/2023    CREATININE 1.4 06/08/2023    BUN 18 06/08/2023    K 3.5 06/08/2023     06/08/2023     06/08/2023     Results for orders placed or performed during the hospital encounter of 03/01/22   SCHEDULED EKG 12-LEAD (to Muse)    Collection Time: 03/01/22 12:33 PM    Narrative    Test Reason : I10,    Vent. Rate : 063 BPM     Atrial Rate : 063 BPM     P-R Int : 146 ms          QRS Dur : 092 ms      QT Int : 380 ms       P-R-T Axes : 038 009 014 degrees     QTc Int : 388 ms    Normal sinus rhythm  Normal ECG  No previous ECGs available  Confirmed by CAMI SHARMA, CARL ([id number]) on 3/1/2022 9:49:36 PM    Referred By: RACHEL DE LA TORRE           Confirmed By:CARL ALMANZA MD

## 2023-06-15 NOTE — PATIENT INSTRUCTIONS
Treating Gout with Healthy Eating    Gout is a type of arthritis caused by the buildup of uric acid crystals in the joints, leading to inflammation and severe pain. People with gout need to manage their diet carefully to avoid foods that trigger gout attacks and to promote the excretion of uric acid from the body. A diet that is low in purines, a substance found in certain foods that can increase the level of uric acid in the body, is recommended for people with gout.    Here are some foods that are good and bad for people with gout:    Foods to eat:    Low-fat dairy products: Milk, cheese, and yogurt are good sources of low-fat protein and are also low in purines.    Vegetables: Most vegetables are low in purines and can be consumed without any problem. Dark green vegetables like spinach and kale are particularly good for people with gout.    Fruits: Fruits like cherries, berries, oranges, and pineapple are low in purines and can be eaten without any problem.    Complex carbohydrates: Whole-grain bread, pasta, and rice are good sources of complex carbohydrates and are also low in purines.    Water: Drinking plenty of water can help to flush out uric acid from the body and prevent gout attacks.    Foods to avoid:    Organ meats: Liver, kidney, and other organ meats are high in purines and should be avoided.    Seafood: Certain types of seafood like shrimp, crab, and lobster are high in purines and should be avoided.    Red meat: Beef, pork, and lamb are high in purines and should be consumed in moderation.    Alcohol: Beer and hard liquor, especially, can increase the risk of gout attacks.    Sugary beverages: High-fructose corn syrup found in sugary beverages can increase uric acid levels and increase the risk of gout attacks.    It's important to note that everyone's body is different, and what may trigger gout attacks in one person may not affect another.      --------------------------------------------------------------------------------     I recommend a Mediterranean diet, which you can learn about at the website of the American Heart Association, www.heart.org.  https://www.heart.org/en/healthy-living/healthy-eating/eat-smart/nutrition-basics/mediterranean-diet     The Mediterranean Diet    The Mediterranean Diet is a nutrient-rich, well-balanced eating plan inspired by the traditional culinary practices of countries bordering the [place] Sea, such as [place], [place], [place], and [place]. This diet has been widely recognized for its myriad health benefits, which can be attributed to its emphasis on whole, unprocessed foods, plant-based ingredients, and moderate consumption of lean proteins and healthy fats.    Rich in fruits and vegetables: The Mediterranean Diet places a strong focus on the consumption of colorful fruits and vegetables, which are loaded with vitamins, minerals, antioxidants, and dietary fiber. These nutrients contribute to a healthier immune system, improved digestion, and reduced risk of chronic diseases, such as cardiovascular disease and certain types of cancer.    Whole grains and legumes: Whole grains like brown rice, quinoa, and whole wheat bread, as well as legumes like lentils, chickpeas, and beans, are staples in the Mediterranean Diet. They provide a good source of complex carbohydrates, fiber, and essential nutrients, which help regulate blood sugar levels, promote healthy digestion, and support heart health.    Healthy fats: This diet emphasizes the use of healthy fats, such as olive oil, which is rich in monounsaturated fats and antioxidants. These fats have been shown to help reduce bad cholesterol (LDL) levels, raise good cholesterol (HDL) levels, and lower the risk of heart disease. Additionally, fatty fish like salmon, mackerel, and sardines are excellent sources of omega-3 fatty acids, which have anti-inflammatory properties  and support brain health.    Lean proteins: The Mediterranean Diet encourages the consumption of lean proteins, such as fish, poultry, and plant-based sources like legumes, nuts, and seeds. These high-quality proteins provide essential amino acids for muscle growth and repair, while also reducing the risk of heart disease and promoting overall health.    Moderate dairy intake: Dairy products, such as yogurt and cheese, are consumed in moderation in the Mediterranean Diet. These foods are good sources of calcium and other essential nutrients, which support bone health and can help prevent osteoporosis.    Limited red meat and processed foods: This diet recommends limiting the intake of red meat and processed foods, which are often high in saturated fats, sodium, and added sugars. By doing so, the Mediterranean Diet helps reduce the risk of heart disease, obesity, and type 2 diabetes.    Emphasis on herbs and spices: The Mediterranean Diet incorporates a wide variety of herbs and spices, such as basil, oregano, rosemary, and thyme, which not only add flavor to meals but also offer potential health benefits due to their antioxidant and anti-inflammatory properties.    Moderate alcohol consumption: The Mediterranean Diet permits moderate alcohol consumption, particularly red wine, which contains polyphenols that have been associated with heart health benefits. However, moderation is armenta, as excessive alcohol consumption can lead to adverse health effects.    Social and lifestyle factors: Beyond the food choices, the Mediterranean lifestyle encourages regular physical activity, adequate rest, and enjoying meals in a social setting with family and friends. These elements contribute to overall well-being, stress reduction, and a balanced approach to health.    In summary, the Mediterranean Diet offers a plethora of health benefits due to its emphasis on whole, nutrient-dense foods, healthy fats, and lean proteins. By  adopting this eating pattern, individuals can reduce the risk of chronic diseases, improve their overall well-being, and enjoy a flavorful, diverse range of meals.

## 2023-06-15 NOTE — PROGRESS NOTES
"OFFICE VISIT 6/15/23  9:00 AM CDT    Subjective   CHIEF COMPLAINT: Results    Recent lab results were reviewed, notable for elevated urine phosphorus excretion and low serum phosphorus. We discussed the differential diagnosis. He agreed to a nephrology e-consult.    While his Low-Density Lipoprotein (LDL) cholesterol is at goal, his triglycerides are elevated. He exercises multiple days per week and maintains a healthy diet but has recently increased his red meat intake. He plans to make dietary modifications to lower his triglycerides. He has not had a gout attack in years. He declined pharmacotherapy for his gout but agreed to adopt a low purine diet. He will schedule a virtual visit with me if he develops a gout attack. His hypertension is controlled based on digital medicine program readings.    His fatty liver disease is clinically stable, and we are treating it with therapeutic lifestyle changes.    Additional educational information was provided. Refer to the after-visit summary.      Review of Systems   Respiratory:  Negative for chest tightness and shortness of breath.    Cardiovascular:  Negative for chest pain.   Endocrine: Negative for polydipsia and polyuria.       Objective   Vitals:    06/15/23 0850 06/15/23 0852   BP: (!) 150/88 (!) 142/86   BP Location: Right arm Right arm   Patient Position: Sitting Sitting   BP Method: Large (Manual) Large (Manual)   Pulse: 68    Temp: 98.1 °F (36.7 °C)    SpO2: 97%    Weight: 92.4 kg (203 lb 11.3 oz)    Height: 5' 11" (1.803 m)    Physical Exam  Vitals reviewed.   Constitutional:       General: He is not in acute distress.     Appearance: Normal appearance. He is not ill-appearing, toxic-appearing or diaphoretic.   Cardiovascular:      Rate and Rhythm: Normal rate and regular rhythm.   Pulmonary:      Effort: Pulmonary effort is normal.   Neurological:      Mental Status: He is alert and oriented to person, place, and time. Mental status is at baseline. "   Psychiatric:         Mood and Affect: Mood normal.         Behavior: Behavior normal.         Judgment: Judgment normal.        Assessment and Plan   1. Primary hypertension  Assessment & Plan:  BP Readings from Last 6 Encounters:   06/15/23 (!) 142/86   01/09/23 (!) 140/86   12/15/22 136/60   06/01/22 124/82   03/15/22 136/84   03/01/22 (!) 144/80      Last 5 Patient Entered Readings                Current 30 Day Average: 119/75  Recent Readings 6/15/2023 6/14/2023 6/2/2023 6/1/2023 5/29/2023   SBP (mmHg) 121 108 113 124 100   DBP (mmHg) 78 64 76 79 60   Pulse 85 92 89 88 89                 Lab Results   Component Value Date    EGFRNORACEVR 56.5 (A) 06/08/2023    CREATININE 1.4 06/08/2023    BUN 18 06/08/2023    K 3.5 06/08/2023     06/08/2023     06/08/2023     Results for orders placed or performed during the hospital encounter of 03/01/22   SCHEDULED EKG 12-LEAD (to Muse)    Collection Time: 03/01/22 12:33 PM    Narrative    Test Reason : I10,    Vent. Rate : 063 BPM     Atrial Rate : 063 BPM     P-R Int : 146 ms          QRS Dur : 092 ms      QT Int : 380 ms       P-R-T Axes : 038 009 014 degrees     QTc Int : 388 ms    Normal sinus rhythm  Normal ECG  No previous ECGs available  Confirmed by CARL ALMANZA MD ([id number]) on 3/1/2022 9:49:36 PM    Referred By: RACHEL DE LA TORRE           Confirmed By:CARL ALMANZA MD         Orders:  -     amlodipine-valsartan (EXFORGE)  mg per tablet; Take 1 tablet by mouth once daily.  Dispense: 90 tablet; Refill: 3  -     hydroCHLOROthiazide (HYDRODIURIL) 25 MG tablet; Take 1 tablet (25 mg total) by mouth once daily.  Dispense: 90 tablet; Refill: 3    2. Chronic kidney disease, stage 3a  Assessment & Plan:  Lab Results   Component Value Date    EGFRNORACEVR 56.5 (A) 06/08/2023    EGFRNORACEVR >60.0 12/12/2022    CREATININE 1.4 06/08/2023    CREATININE 1.2 12/12/2022    CREATININE 1.2 03/01/2022    BUN 18 06/08/2023    BUN 14 12/12/2022    BUN 18 03/01/2022         Orders:  -     E-Consult to Nephrology    3. Mixed hyperlipidemia  Assessment & Plan:  Lab Results   Component Value Date    CHOL 151 06/08/2023    CHOL 146 12/12/2022    TRIG 277 (H) 06/08/2023    TRIG 178 (H) 12/12/2022    HDL 37 (L) 06/08/2023    HDL 44 12/12/2022    LDLCALC 58.6 (L) 06/08/2023    LDLCALC 66.4 12/12/2022    NONHDLCHOL 114 06/08/2023    NONHDLCHOL 102 12/12/2022    AST 21 06/08/2023    ALT 14 06/08/2023     The 10-year ASCVD risk score (Chelsey CAMARENA, et al., 2019) is: 14.7%    Values used to calculate the score:      Age: 63 years      Sex: Male      Is Non- : No      Diabetic: No      Tobacco smoker: No      Systolic Blood Pressure: 142 mmHg      Is BP treated: Yes      HDL Cholesterol: 37 mg/dL      Total Cholesterol: 151 mg/dL     Orders:  -     rosuvastatin (CRESTOR) 20 MG tablet; Take 1 tablet (20 mg total) by mouth every evening. (For cholesterol and heart health)  Dispense: 90 tablet; Refill: 3    4. Fatty liver  Overview:  US RETROPERITONEAL COMPLETE 03/01/2022  FINDINGS: Incidentally noted hepatic steatosis.    Assessment & Plan:  FIB-4 Calculation: 1.29 at 6/15/2023  9:29 AM  Calculated from:  Last SGOT/AST : 21 at 6/15/2023  9:29 AM  Last SGPT/ALT: 14 at 6/15/2023  9:29 AM   Platelets: 275 at 6/15/2023  9:29 AM   Age: 63 y.o.   FIB-4 below 1.30 is considered as low-risk for advanced fibrosis  FIB-4 over 2.67 is considered as high-risk for advanced fibrosis  FIB-4 values between 1.30 and 2.67 are considered as intermediate-risk of advanced fibrosis for ages 36-64.   For ages > 64 the cut-off for low-risk goes to < 2.  This is a screening tool and clinical judgement should be used in the interpretation of these results.    Lab Results   Component Value Date    AST 21 06/08/2023    AST 25 12/12/2022    ALT 14 06/08/2023    ALT 21 12/12/2022    ALBUMIN 4.2 06/08/2023    ALBUMIN 4.4 12/12/2022     02/09/2021     01/28/2020         5. 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 History of DVT of lower extremity  Overview:  Unprovoked LLE DVT in Sep-Oct 2021, treated with Xarelto for 18 months. His vascular specialist said he didn't need further anticoagulation.      7. Hypophosphatemia  -     E-Consult to Nephrology    Unless noted herein, any chronic conditions are represented as and appear stable, and no other significant complaints or concerns were reported.    Follow up in about 9 months (around 3/15/2024) for annual wellness exam.        Documentation entered by me for this encounter may have been done in part using speech-recognition technology. Although I have made an effort to ensure accuracy, "sound like" errors may exist and should be interpreted in context.   "

## 2023-06-15 NOTE — ASSESSMENT & PLAN NOTE
Lab Results   Component Value Date    EGFRNORACEVR 56.5 (A) 06/08/2023    EGFRNORACEVR >60.0 12/12/2022    CREATININE 1.4 06/08/2023    CREATININE 1.2 12/12/2022    CREATININE 1.2 03/01/2022    BUN 18 06/08/2023    BUN 14 12/12/2022    BUN 18 03/01/2022

## 2023-06-15 NOTE — CONSULTS
[facility]  Response for E-Consult     Patient Name: Darek Leal  MRN: [id number]  Primary Care Provider: KIMMY Haile MD   Requesting Provider: KIMMY Haile MD  Consults    Findings:  63-year-old male with history of hypophosphatemia for at least 1 year.  Also has late stage II early stage III CKD.  It should be noted that his creatinine is essentially normal ranging between 1.2 and 1.4.  EGFR is also normal for his age as EGFR tends to decrease by between 1-1.5 every year after a patient turns 30.    Additionally he is on thiazide diuretic and a RAAS inhibitor which will cause fluctuation in creatinine.    He is had persistent mild hypophosphatemia.  24 hour urine for phosphorus also showed mildly elevated phosphorus elimination.  He likely has tubular phosphate leak which is not significant.  It appears that his PTH was normal few years ago however secondary hyperparathyroidism can also cause hypophosphatemia associated with declined GFR.    He can try to increase phosphorus in his diet.  Otherwise there is no specific treatment.    I did not speak to the requesting provider verbally about this.     Total time of Consultation: 35 minute    Percentage of time spent on written/verbal discussion: 50%     *This eConsult is based on the clinical data available to me and is furnished without benefit of a physical examination. The eConsult will need to be interpreted in light of any clinical issues or changes in patient status not available to me at the time of filing this eConsults. Significant changes in patient condition or level of acuity should result in immediate formal consultation and reevaluation. Please alert me if you have further questions.    Thank you for your consult.     Horacio Rossi MD  [facility]

## 2023-06-15 NOTE — ASSESSMENT & PLAN NOTE
FIB-4 Calculation: 1.29 at 6/15/2023  9:29 AM  Calculated from:  Last SGOT/AST : 21 at 6/15/2023  9:29 AM  Last SGPT/ALT: 14 at 6/15/2023  9:29 AM   Platelets: 275 at 6/15/2023  9:29 AM   Age: 63 y.o.    FIB-4 below 1.30 is considered as low-risk for advanced fibrosis   FIB-4 over 2.67 is considered as high-risk for advanced fibrosis   FIB-4 values between 1.30 and 2.67 are considered as intermediate-risk of advanced fibrosis for ages 36-64.    For ages > 64 the cut-off for low-risk goes to < 2.  This is a screening tool and clinical judgement should be used in the interpretation of these results.    Lab Results   Component Value Date    AST 21 06/08/2023    AST 25 12/12/2022    ALT 14 06/08/2023    ALT 21 12/12/2022    ALBUMIN 4.2 06/08/2023    ALBUMIN 4.4 12/12/2022     02/09/2021     01/28/2020

## 2023-07-16 ENCOUNTER — PATIENT MESSAGE (OUTPATIENT)
Dept: INTERNAL MEDICINE | Facility: CLINIC | Age: 63
End: 2023-07-16
Payer: COMMERCIAL

## 2023-07-16 NOTE — TELEPHONE ENCOUNTER
Nephrology E-Consult Follow-up  Darek Mari.    I consulted one of our nephrologists about your elevated phosphate levels.    Here is a summary of their response:    63-year-old male with history of hypophosphatemia for at least 1 year.  Also has late stage II early stage III CKD.  It should be noted that his creatinine is essentially normal ranging between 1.2 and 1.4.  EGFR is also normal for his age as EGFR tends to decrease by between 1-1.5 every year after a patient turns 30.     Additionally he is on thiazide diuretic and a RAAS inhibitor which will cause fluctuation in creatinine.     He is had persistent mild hypophosphatemia.  24 hour urine for phosphorus also showed mildly elevated phosphorus elimination.  He likely has tubular phosphate leak which is not significant.  It appears that his PTH was normal few years ago however secondary hyperparathyroidism can also cause hypophosphatemia associated with declined GFR.     He can try to increase phosphorus in his diet.  Otherwise there is no specific treatment.    Let me know if you have any questions.    All the best,    RACHEL Haile MD

## 2024-03-12 ENCOUNTER — LAB VISIT (OUTPATIENT)
Dept: LAB | Facility: HOSPITAL | Age: 64
End: 2024-03-12
Attending: FAMILY MEDICINE
Payer: COMMERCIAL

## 2024-03-12 ENCOUNTER — OFFICE VISIT (OUTPATIENT)
Dept: INTERNAL MEDICINE | Facility: CLINIC | Age: 64
End: 2024-03-12
Payer: COMMERCIAL

## 2024-03-12 VITALS
OXYGEN SATURATION: 97 % | RESPIRATION RATE: 18 BRPM | HEIGHT: 71 IN | BODY MASS INDEX: 28.89 KG/M2 | WEIGHT: 206.38 LBS | DIASTOLIC BLOOD PRESSURE: 82 MMHG | TEMPERATURE: 97 F | SYSTOLIC BLOOD PRESSURE: 130 MMHG | HEART RATE: 79 BPM

## 2024-03-12 DIAGNOSIS — N18.31 CHRONIC KIDNEY DISEASE, STAGE 3A: Chronic | ICD-10-CM

## 2024-03-12 DIAGNOSIS — E78.2 MIXED HYPERLIPIDEMIA: Chronic | ICD-10-CM

## 2024-03-12 DIAGNOSIS — E83.39 HYPOPHOSPHATEMIA: Chronic | ICD-10-CM

## 2024-03-12 DIAGNOSIS — K76.0 FATTY LIVER: Chronic | ICD-10-CM

## 2024-03-12 DIAGNOSIS — K21.9 GASTROESOPHAGEAL REFLUX DISEASE WITHOUT ESOPHAGITIS: Chronic | ICD-10-CM

## 2024-03-12 DIAGNOSIS — Z13.1 SCREENING FOR DIABETES MELLITUS: ICD-10-CM

## 2024-03-12 DIAGNOSIS — E66.3 OVERWEIGHT (BMI 25.0-29.9): Chronic | ICD-10-CM

## 2024-03-12 DIAGNOSIS — I10 PRIMARY HYPERTENSION: Primary | Chronic | ICD-10-CM

## 2024-03-12 DIAGNOSIS — M1A.0720 CHRONIC IDIOPATHIC GOUT INVOLVING TOE OF LEFT FOOT WITHOUT TOPHUS: Chronic | ICD-10-CM

## 2024-03-12 DIAGNOSIS — I10 PRIMARY HYPERTENSION: Chronic | ICD-10-CM

## 2024-03-12 DIAGNOSIS — Z12.5 SCREENING PSA (PROSTATE SPECIFIC ANTIGEN): ICD-10-CM

## 2024-03-12 PROBLEM — I82.402 ACUTE DEEP VEIN THROMBOSIS (DVT) OF LEFT LOWER EXTREMITY: Chronic | Status: RESOLVED | Noted: 2022-03-01 | Resolved: 2024-03-12

## 2024-03-12 LAB
ALBUMIN SERPL BCP-MCNC: 4.5 G/DL (ref 3.5–5.2)
ALP SERPL-CCNC: 46 U/L (ref 55–135)
ALT SERPL W/O P-5'-P-CCNC: 17 U/L (ref 10–44)
ANION GAP SERPL CALC-SCNC: 8 MMOL/L (ref 8–16)
AST SERPL-CCNC: 29 U/L (ref 10–40)
BILIRUB SERPL-MCNC: 1 MG/DL (ref 0.1–1)
BUN SERPL-MCNC: 20 MG/DL (ref 8–23)
CALCIUM SERPL-MCNC: 10.4 MG/DL (ref 8.7–10.5)
CHLORIDE SERPL-SCNC: 102 MMOL/L (ref 95–110)
CHOLEST SERPL-MCNC: 148 MG/DL (ref 120–199)
CHOLEST/HDLC SERPL: 3.9 {RATIO} (ref 2–5)
CO2 SERPL-SCNC: 30 MMOL/L (ref 23–29)
COMPLEXED PSA SERPL-MCNC: 2.2 NG/ML (ref 0–4)
CREAT SERPL-MCNC: 1.5 MG/DL (ref 0.5–1.4)
EST. GFR  (NO RACE VARIABLE): 51.7 ML/MIN/1.73 M^2
ESTIMATED AVG GLUCOSE: 114 MG/DL (ref 68–131)
GGT SERPL-CCNC: 30 U/L (ref 8–55)
GLUCOSE SERPL-MCNC: 99 MG/DL (ref 70–110)
HBA1C MFR BLD: 5.6 % (ref 4–5.6)
HDLC SERPL-MCNC: 38 MG/DL (ref 40–75)
HDLC SERPL: 25.7 % (ref 20–50)
LDLC SERPL CALC-MCNC: 74.6 MG/DL (ref 63–159)
NONHDLC SERPL-MCNC: 110 MG/DL
PHOSPHATE SERPL-MCNC: 3.1 MG/DL (ref 2.7–4.5)
POTASSIUM SERPL-SCNC: 3.3 MMOL/L (ref 3.5–5.1)
PROT SERPL-MCNC: 7.6 G/DL (ref 6–8.4)
SODIUM SERPL-SCNC: 140 MMOL/L (ref 136–145)
TRIGL SERPL-MCNC: 177 MG/DL (ref 30–150)
URATE SERPL-MCNC: 8.1 MG/DL (ref 3.4–7)

## 2024-03-12 PROCEDURE — 99214 OFFICE O/P EST MOD 30 MIN: CPT | Mod: S$GLB,,, | Performed by: FAMILY MEDICINE

## 2024-03-12 PROCEDURE — 82977 ASSAY OF GGT: CPT | Performed by: FAMILY MEDICINE

## 2024-03-12 PROCEDURE — 83036 HEMOGLOBIN GLYCOSYLATED A1C: CPT | Performed by: FAMILY MEDICINE

## 2024-03-12 PROCEDURE — 84550 ASSAY OF BLOOD/URIC ACID: CPT | Performed by: FAMILY MEDICINE

## 2024-03-12 PROCEDURE — 36415 COLL VENOUS BLD VENIPUNCTURE: CPT | Performed by: FAMILY MEDICINE

## 2024-03-12 PROCEDURE — 84100 ASSAY OF PHOSPHORUS: CPT | Performed by: FAMILY MEDICINE

## 2024-03-12 PROCEDURE — 80053 COMPREHEN METABOLIC PANEL: CPT | Performed by: FAMILY MEDICINE

## 2024-03-12 PROCEDURE — 99999 PR PBB SHADOW E&M-EST. PATIENT-LVL IV: CPT | Mod: PBBFAC,,, | Performed by: FAMILY MEDICINE

## 2024-03-12 PROCEDURE — 84153 ASSAY OF PSA TOTAL: CPT | Performed by: FAMILY MEDICINE

## 2024-03-12 PROCEDURE — 80061 LIPID PANEL: CPT | Performed by: FAMILY MEDICINE

## 2024-03-12 RX ORDER — OMEPRAZOLE 20 MG/1
20 CAPSULE, DELAYED RELEASE ORAL DAILY
COMMUNITY
Start: 2024-03-12

## 2024-03-12 RX ORDER — ROSUVASTATIN CALCIUM 20 MG/1
20 TABLET, COATED ORAL NIGHTLY
Qty: 90 TABLET | Refills: 3 | Status: SHIPPED | OUTPATIENT
Start: 2024-03-12 | End: 2025-03-12

## 2024-03-12 RX ORDER — HYDROCHLOROTHIAZIDE 25 MG/1
25 TABLET ORAL DAILY
Qty: 90 TABLET | Refills: 3 | Status: SHIPPED | OUTPATIENT
Start: 2024-03-12

## 2024-03-12 RX ORDER — AMLODIPINE AND VALSARTAN 10; 320 MG/1; MG/1
1 TABLET ORAL DAILY
Qty: 90 TABLET | Refills: 3 | Status: SHIPPED | OUTPATIENT
Start: 2024-03-12 | End: 2025-03-12

## 2024-03-12 RX ORDER — ALLOPURINOL 100 MG/1
100 TABLET ORAL DAILY
Qty: 90 TABLET | Refills: 3 | Status: SHIPPED | OUTPATIENT
Start: 2024-03-12

## 2024-03-12 NOTE — ASSESSMENT & PLAN NOTE
"Wt Readings from Last 6 Encounters:   03/12/24 93.6 kg (206 lb 5.6 oz)   06/15/23 92.4 kg (203 lb 11.3 oz)   01/09/23 93.5 kg (206 lb 2.1 oz)   12/15/22 95.1 kg (209 lb 10.5 oz)   06/01/22 90.9 kg (200 lb 6.4 oz)   03/01/22 92.6 kg (204 lb 2.3 oz)     Estimated body mass index is 28.78 kg/m² as calculated from the following:    Height as of this encounter: 5' 11" (1.803 m).    Weight as of this encounter: 93.6 kg (206 lb 5.6 oz).    "

## 2024-03-12 NOTE — ASSESSMENT & PLAN NOTE
Lab Results   Component Value Date    AST 21 06/08/2023    AST 25 12/12/2022    AST 25 03/01/2022    ALT 14 06/08/2023    ALT 21 12/12/2022    ALT 25 03/01/2022

## 2024-03-12 NOTE — PROGRESS NOTES
OFFICE VISIT 3/12/24  9:00 AM CDT    History of Present Illness    Darek presents today for follow-up.    Darek is currently on Allopurinol regimen for gout management and reports no recent gout attacks, indicating effective control.    Thrice-weekly doses of OTC Prilosec (Monday, Wednesday, and Friday) manage his acid reflux. Occurrences of indigestion, belching and breakthrough symptoms of heartburn are infrequent. Overall, he tolerates this medication regimen without significant issue.    His latest kidney function labs yielded good results, however, he acknowledges a struggle with maintaining optimum hydration levels.    He was treated with anticoagulants for approximately a year and a half for a singular episode of a leg blood clot. Since discontinuation of the medication, he has had no further incidences and has increased physical exercise.    In response to a previous low phosphorus level in the blood, a nephrologist advised an increase in his dietary phosphorus intake.    His current weight fluctuates between 200-209 lbs and he ideally targets a weight of around 200 lbs.    He is due for several vaccines, but is declining the shingles vaccine and the COVID-19 vaccine at this time.    He had a previous injury involving a hook caught in his hand.         Assessment & Plan    PROSTATE CANCER SCREENING:   Ordered a PSA test for prostate cancer screening.  DIABETES SCREENING:   Ordered an A1c test for diabetes screening.  CARDIOVASCULAR HEALTH:   Ordered an EKG due to the last one being two years prior.  HYPERLIPIDEMIA:   Requested a cholesterol panel.  METABOLIC HEALTH:   Requested a metabolic panel, and measurements of uric acid and serum phosphorus levels.  PHOSPHORUS DEFICIENCY:   Discussed the importance of phosphorus in the body, emphasizing its role in bone health, calcium metabolism, and its interaction with other salts in the blood such as magnesium.   Advised on the need to increase dietary phosphorus  intake.  WEIGHT MANAGEMENT:   Recommend weight loss for improved health.  BLOOD THINNERS TREATMENT:   Explained the potential risks and benefits of discontinuing anticoagulants after one year of treatment.  TETANUS EDUCATION:   Clarified that tetanus is an infection that can occur when cut with something dirty.       1. Primary hypertension  -     Comprehensive Metabolic Panel; Future; Expected date: 03/12/2024  -     SCHEDULED EKG 12-LEAD (to Muse); Future  -     amlodipine-valsartan (EXFORGE)  mg per tablet; Take 1 tablet by mouth once daily.  Dispense: 90 tablet; Refill: 3  -     hydroCHLOROthiazide (HYDRODIURIL) 25 MG tablet; Take 1 tablet (25 mg total) by mouth once daily.  Dispense: 90 tablet; Refill: 3    2. Chronic idiopathic gout involving toe of left foot without tophus  -     URIC ACID; Future; Expected date: 03/12/2024  -     allopurinoL (ZYLOPRIM) 100 MG tablet; Take 1 tablet (100 mg total) by mouth once daily. (for prevention of gout)  Dispense: 90 tablet; Refill: 3    3. Mixed hyperlipidemia  -     Lipid Panel; Future; Expected date: 03/12/2024  -     Comprehensive Metabolic Panel; Future; Expected date: 03/12/2024  -     rosuvastatin (CRESTOR) 20 MG tablet; Take 1 tablet (20 mg total) by mouth every evening. (For cholesterol and heart health)  Dispense: 90 tablet; Refill: 3    4. Hypophosphatemia  -     PHOSPHORUS; Future; Expected date: 03/12/2024    5. Fatty liver  Overview:  US RETROPERITONEAL COMPLETE 03/01/2022  FINDINGS: Incidentally noted hepatic steatosis.    Assessment & Plan:  Lab Results   Component Value Date    AST 21 06/08/2023    AST 25 12/12/2022    AST 25 03/01/2022    ALT 14 06/08/2023    ALT 21 12/12/2022    ALT 25 03/01/2022         Orders:  -     GAMMA GT; Future; Expected date: 03/12/2024    6. Overweight (BMI 25.0-29.9)  Assessment & Plan:  Wt Readings from Last 6 Encounters:   03/12/24 93.6 kg (206 lb 5.6 oz)   06/15/23 92.4 kg (203 lb 11.3 oz)   01/09/23 93.5 kg (206 lb  "2.1 oz)   12/15/22 95.1 kg (209 lb 10.5 oz)   06/01/22 90.9 kg (200 lb 6.4 oz)   03/01/22 92.6 kg (204 lb 2.3 oz)     Estimated body mass index is 28.78 kg/m² as calculated from the following:    Height as of this encounter: 5' 11" (1.803 m).    Weight as of this encounter: 93.6 kg (206 lb 5.6 oz).        7. Chronic kidney disease, stage 3a  -     Comprehensive Metabolic Panel; Future; Expected date: 03/12/2024    8. Gastroesophageal reflux disease without esophagitis  -     omeprazole (PRILOSEC) 20 MG capsule; Take 1 capsule (20 mg total) by mouth once daily.    9. Screening for diabetes mellitus  -     Hemoglobin A1C; Future; Expected date: 03/12/2024    10. Screening PSA (prostate specific antigen)  -     PSA, Screening; Future; Expected date: 03/12/2024       Unless noted herein, any chronic conditions are represented as and appear stable, and no other significant complaints or concerns were reported.    Vitals:    03/12/24 0845   BP: 130/82   BP Location: Left arm   Patient Position: Sitting   BP Method: Large (Manual)   Pulse: 79   Resp: 18   Temp: 96.9 °F (36.1 °C)   SpO2: 97%   Weight: 93.6 kg (206 lb 5.6 oz)   Height: 5' 11" (1.803 m)   Physical Exam  Physical Exam    Constitutional: No acute distress. Normal appearance. Not ill-appearing.  Cardiovascular: Normal heart sounds. Carotid arteries sound good.  Pulmonary: Pulmonary effort is normal. No respiratory distress. Lungs are clear.  Skin: Skin is not jaundiced.  Neurological: Alert. Mental status is at baseline.  Psychiatric: Mood normal. Behavior normal. Thought content normal.         This note was generated with the assistance of ambient listening technology. Verbal consent was obtained by the patient and accompanying visitor(s) for the recording of patient appointment to facilitate this note. I attest to having reviewed and edited the generated note for accuracy, though some syntax or spelling errors may persist. Please contact the author of this " "note for any clarification.    Documentation entered by me for this encounter may have been done in part using speech-recognition technology. Although I have made an effort to ensure accuracy, "sound like" errors may exist and should be interpreted in context.   "

## 2024-03-13 ENCOUNTER — HOSPITAL ENCOUNTER (OUTPATIENT)
Dept: CARDIOLOGY | Facility: HOSPITAL | Age: 64
Discharge: HOME | End: 2024-03-13
Attending: FAMILY MEDICINE
Payer: COMMERCIAL

## 2024-03-13 DIAGNOSIS — I10 PRIMARY HYPERTENSION: Chronic | ICD-10-CM

## 2024-03-13 PROCEDURE — 93010 ELECTROCARDIOGRAM REPORT: CPT | Mod: ,,, | Performed by: INTERNAL MEDICINE

## 2024-03-13 PROCEDURE — 93005 ELECTROCARDIOGRAM TRACING: CPT

## 2024-03-14 LAB
OHS QRS DURATION: 88 MS
OHS QTC CALCULATION: 415 MS

## 2024-12-27 ENCOUNTER — PATIENT MESSAGE (OUTPATIENT)
Dept: INTERNAL MEDICINE | Facility: CLINIC | Age: 64
End: 2024-12-27
Payer: COMMERCIAL

## 2025-01-06 ENCOUNTER — TELEPHONE (OUTPATIENT)
Dept: INTERNAL MEDICINE | Facility: CLINIC | Age: 65
End: 2025-01-06
Payer: MEDICARE

## 2025-01-06 NOTE — TELEPHONE ENCOUNTER
----- Message from Justin sent at 1/6/2025  9:13 AM CST -----  Contact: Frances  .Type:  RX Refill Request    Who Called:  Eduardo   Refill or New Rx: Refill   RX Name and Strength: amlodipine-valsartan (EXFORGE)  mg per tablet  How is the patient currently taking it? (ex. 1XDay): Take 1 tablet by mouth once daily.  Is this a 30 day or 90 day RX: 90 day     Refill or New Rx: Refill   RX Name and Strength: rosuvastatin (CRESTOR) 20 MG tablet  How is the patient currently taking it? (ex. 1XDay): 1x day   Is this a 30 day or 90 day RX: 90 day     Refill or New Rx: Refill allopurinoL (ZYLOPRIM) 100 MG tablet  RX Name and Strength:   How is the patient currently taking it? (ex. 1XDay): 1x day   Is this a 30 day or 90 day RX: 90 day     Refill or New Rx: Refill   RX Name and Strength: hydroCHLOROthiazide (HYDRODIURIL) 25 MG tablet  How is the patient currently taking it? (ex. 1XDay): 1 x day   Is this a 30 day or 90 day RX: 90 day     Refill or New Rx: Refill   RX Name and Strength: omeprazole (PRILOSEC) 20 MG capsule  How is the patient currently taking it? (ex. 1XDay): 1 x day   Is this a 30 day or 90 day RX: 90 day     Preferred Pharmacy with phone number: [facility] [address]  [address] [address]  Phone: [phone] Fax: [phone]      Local or Mail Order: Mail Order   Ordering Provider: Raymon Haile   Would the patient rather a call back or a response via [facility]?  [facility] [employer] with status update  Best Call Back Number: .  Additional Information:   Frances states pt is completely out of all medication and pt no longer uses [facility] for refills     Thanks

## 2025-01-08 DIAGNOSIS — K21.9 GASTROESOPHAGEAL REFLUX DISEASE WITHOUT ESOPHAGITIS: Chronic | ICD-10-CM

## 2025-01-08 DIAGNOSIS — M1A.0720 CHRONIC IDIOPATHIC GOUT INVOLVING TOE OF LEFT FOOT WITHOUT TOPHUS: Chronic | ICD-10-CM

## 2025-01-08 DIAGNOSIS — I10 PRIMARY HYPERTENSION: Chronic | ICD-10-CM

## 2025-01-08 DIAGNOSIS — E78.2 MIXED HYPERLIPIDEMIA: Chronic | ICD-10-CM

## 2025-01-08 NOTE — TELEPHONE ENCOUNTER
No care due was identified.  [facility] Embedded Care Due Messages. Reference number: [id number].   1/08/2025 4:41:25 PM CST

## 2025-01-08 NOTE — TELEPHONE ENCOUNTER
No care due was identified.  [facility] Embedded Care Due Messages. Reference number: [id number].   1/08/2025 4:42:37 PM CST

## 2025-01-10 ENCOUNTER — PATIENT MESSAGE (OUTPATIENT)
Dept: INTERNAL MEDICINE | Facility: CLINIC | Age: 65
End: 2025-01-10
Payer: MEDICARE

## 2025-01-10 ENCOUNTER — TELEPHONE (OUTPATIENT)
Dept: INTERNAL MEDICINE | Facility: CLINIC | Age: 65
End: 2025-01-10
Payer: MEDICARE

## 2025-01-10 RX ORDER — HYDROCHLOROTHIAZIDE 25 MG/1
25 TABLET ORAL DAILY
Qty: 90 TABLET | Refills: 0 | Status: SHIPPED | OUTPATIENT
Start: 2025-01-10

## 2025-01-10 RX ORDER — ROSUVASTATIN CALCIUM 20 MG/1
20 TABLET, COATED ORAL NIGHTLY
Qty: 90 TABLET | Refills: 0 | Status: SHIPPED | OUTPATIENT
Start: 2025-01-10 | End: 2025-04-10

## 2025-01-10 RX ORDER — ALLOPURINOL 100 MG/1
100 TABLET ORAL DAILY
Qty: 90 TABLET | Refills: 0 | Status: SHIPPED | OUTPATIENT
Start: 2025-01-10

## 2025-01-10 RX ORDER — AMLODIPINE AND VALSARTAN 10; 320 MG/1; MG/1
1 TABLET ORAL DAILY
Qty: 90 TABLET | Refills: 0 | Status: SHIPPED | OUTPATIENT
Start: 2025-01-10 | End: 2025-04-10

## 2025-01-10 RX ORDER — OMEPRAZOLE 20 MG/1
20 CAPSULE, DELAYED RELEASE ORAL DAILY
Qty: 90 CAPSULE | Refills: 0 | Status: SHIPPED | OUTPATIENT
Start: 2025-01-10

## 2025-01-10 NOTE — TELEPHONE ENCOUNTER
Called pt to advise him of 90 day supply being [facility] to [facility] and to address his issues with not having meds for 3 weeks and explaining the reasons. Pt was very upset about the whole situation, I did explain it is not his fault that is on [facility], we did not expect for Dr. Haile to be out this long, pt says he hope the phone conversation was recorded for everything he had to say, he requested  survey to express his issues with [facility]. I advised him that as the nurse we do not approve or deny meds only the doctor, I told him 12/31 when I spoke with him I attempted to get central refill to approve but was denied due to him needing an appointment. I also apologized to patient for all the craziness and explained that I will assist in any way I can.

## 2025-01-10 NOTE — TELEPHONE ENCOUNTER
Limited refill approved.  In-office appointment required for more refills.    TO MY TEAM: Please help Darek schedule appointment before they will run out of their medicine.

## 2025-01-10 NOTE — TELEPHONE ENCOUNTER
----- Message from Francesco Belcher sent at 1/10/2025 11:20 AM CST -----    ----- Message -----  From: Helene Jones  Sent: 1/10/2025  11:07 AM CST  To: Torey Ash Staff    Type:  Patient Returning Call    Who Called:Darek  Who Left Message for Patient:  Does the patient know what this is regarding?:Medication   Would the patient rather a call back or a response via [facility]? Callback  Best Call Back Number:[phone]  Additional Information: Patient is calling  to see why prescriptions have not been sent in  patient is very upset . PLEASE Callback

## 2025-02-25 DIAGNOSIS — K21.9 GASTROESOPHAGEAL REFLUX DISEASE WITHOUT ESOPHAGITIS: Chronic | ICD-10-CM

## 2025-02-25 DIAGNOSIS — E78.2 MIXED HYPERLIPIDEMIA: Chronic | ICD-10-CM

## 2025-02-25 DIAGNOSIS — M1A.0720 CHRONIC IDIOPATHIC GOUT INVOLVING TOE OF LEFT FOOT WITHOUT TOPHUS: Chronic | ICD-10-CM

## 2025-02-25 DIAGNOSIS — I10 PRIMARY HYPERTENSION: Chronic | ICD-10-CM

## 2025-02-25 RX ORDER — ROSUVASTATIN CALCIUM 20 MG/1
TABLET, COATED ORAL
Qty: 90 TABLET | Refills: 0 | Status: SHIPPED | OUTPATIENT
Start: 2025-02-25

## 2025-02-25 RX ORDER — OMEPRAZOLE 20 MG/1
20 CAPSULE, DELAYED RELEASE ORAL
Qty: 90 CAPSULE | Refills: 0 | Status: SHIPPED | OUTPATIENT
Start: 2025-02-25

## 2025-02-26 NOTE — TELEPHONE ENCOUNTER
Care Due:                  Date            Visit Type   Department     Provider  --------------------------------------------------------------------------------                                MYCHART                              ANNUAL                              CHECKUP/PHY  [facility]  Last Visit: 03-      S            CARMEN Haile                              EP -                              PRIMARY      [facility]  Next Visit: 03-      CARE ([facility])   MEDICINE       Raymon Haile                                                            Last  Test          Frequency    Reason                     Performed    Due Date  --------------------------------------------------------------------------------    CBC.........  12 months..  allopurinoL..............  Not Found    Overdue    CMP.........  12 months..  allopurinoL,               03- 03-                             amlodipine-valsartan,                             hydroCHLOROthiazide,                             rosuvastatin.............    Lipid Panel.  12 months..  rosuvastatin.............  03- 03-    Uric Acid...  12 months..  allopurinoL..............  03- 03-    [facility] Embedded Care Due Messages. Reference number: [id number].   2/25/2025 9:03:28 PM CST

## 2025-02-26 NOTE — TELEPHONE ENCOUNTER
Refill Routing Note   Medication(s) are not appropriate for processing by [facility] for the following reason(s):        Required labs outdated  Required labs abnormal    [facility] action(s):  Approve  Defer     Requires labs : Yes             Appointments  past 12m or future 3m with PCP    Date Provider   Last Visit   3/12/2024 KIMMY Haile MD   Next Visit   3/12/2025 KIMMY Haile MD   ED visits in past 90 days: 0        Note composed:10:16 PM 02/25/2025

## 2025-02-27 RX ORDER — ALLOPURINOL 100 MG/1
TABLET ORAL
Qty: 90 TABLET | Refills: 0 | Status: SHIPPED | OUTPATIENT
Start: 2025-02-27

## 2025-02-27 RX ORDER — AMLODIPINE AND VALSARTAN 10; 320 MG/1; MG/1
1 TABLET ORAL DAILY
Qty: 90 TABLET | Refills: 0 | Status: SHIPPED | OUTPATIENT
Start: 2025-02-27 | End: 2025-05-28

## 2025-02-27 RX ORDER — HYDROCHLOROTHIAZIDE 25 MG/1
25 TABLET ORAL
Qty: 90 TABLET | Refills: 0 | Status: SHIPPED | OUTPATIENT
Start: 2025-02-27

## 2025-02-27 NOTE — TELEPHONE ENCOUNTER
REFILL APPROVED. Will address further refills at upcoming appointment with me listed below.  #LMRX   --------------------------------  Future Appointments   Date Time Provider Department Center   3/12/2025  8:40 AM KIMMY Haile MD [facility]

## 2025-03-12 ENCOUNTER — OFFICE VISIT (OUTPATIENT)
Dept: INTERNAL MEDICINE | Facility: CLINIC | Age: 65
End: 2025-03-12
Payer: MEDICARE

## 2025-03-12 ENCOUNTER — LAB VISIT (OUTPATIENT)
Dept: LAB | Facility: HOSPITAL | Age: 65
End: 2025-03-12
Attending: FAMILY MEDICINE
Payer: MEDICARE

## 2025-03-12 VITALS
SYSTOLIC BLOOD PRESSURE: 132 MMHG | HEIGHT: 71 IN | OXYGEN SATURATION: 95 % | BODY MASS INDEX: 29.48 KG/M2 | DIASTOLIC BLOOD PRESSURE: 88 MMHG | WEIGHT: 210.56 LBS | HEART RATE: 88 BPM

## 2025-03-12 DIAGNOSIS — I10 PRIMARY HYPERTENSION: Chronic | ICD-10-CM

## 2025-03-12 DIAGNOSIS — M1A.0720 CHRONIC IDIOPATHIC GOUT INVOLVING TOE OF LEFT FOOT WITHOUT TOPHUS: Chronic | ICD-10-CM

## 2025-03-12 DIAGNOSIS — Z12.5 SCREENING PSA (PROSTATE SPECIFIC ANTIGEN): ICD-10-CM

## 2025-03-12 DIAGNOSIS — N18.31 CHRONIC KIDNEY DISEASE, STAGE 3A: Chronic | ICD-10-CM

## 2025-03-12 DIAGNOSIS — E78.2 MIXED HYPERLIPIDEMIA: Chronic | ICD-10-CM

## 2025-03-12 DIAGNOSIS — K21.9 GASTROESOPHAGEAL REFLUX DISEASE WITHOUT ESOPHAGITIS: Chronic | ICD-10-CM

## 2025-03-12 LAB
ALBUMIN SERPL BCP-MCNC: 4.3 G/DL (ref 3.5–5.2)
ALP SERPL-CCNC: 46 U/L (ref 40–150)
ALT SERPL W/O P-5'-P-CCNC: 20 U/L (ref 10–44)
ANION GAP SERPL CALC-SCNC: 10 MMOL/L (ref 8–16)
AST SERPL-CCNC: 27 U/L (ref 10–40)
BILIRUB SERPL-MCNC: 0.6 MG/DL (ref 0.1–1)
BUN SERPL-MCNC: 14 MG/DL (ref 8–23)
CALCIUM SERPL-MCNC: 9.8 MG/DL (ref 8.7–10.5)
CHLORIDE SERPL-SCNC: 101 MMOL/L (ref 95–110)
CHOLEST SERPL-MCNC: 131 MG/DL (ref 120–199)
CHOLEST/HDLC SERPL: 3.5 {RATIO} (ref 2–5)
CO2 SERPL-SCNC: 28 MMOL/L (ref 23–29)
COMPLEXED PSA SERPL-MCNC: 2.3 NG/ML (ref 0–4)
CREAT SERPL-MCNC: 1.3 MG/DL (ref 0.5–1.4)
EST. GFR  (NO RACE VARIABLE): >60 ML/MIN/1.73 M^2
GLUCOSE SERPL-MCNC: 103 MG/DL (ref 70–110)
HDLC SERPL-MCNC: 37 MG/DL (ref 40–75)
HDLC SERPL: 28.2 % (ref 20–50)
LDLC SERPL CALC-MCNC: 58 MG/DL (ref 63–159)
NONHDLC SERPL-MCNC: 94 MG/DL
POTASSIUM SERPL-SCNC: 3.5 MMOL/L (ref 3.5–5.1)
PROT SERPL-MCNC: 7.4 G/DL (ref 6–8.4)
SODIUM SERPL-SCNC: 139 MMOL/L (ref 136–145)
TRIGL SERPL-MCNC: 180 MG/DL (ref 30–150)
URATE SERPL-MCNC: 5.7 MG/DL (ref 3.4–7)

## 2025-03-12 PROCEDURE — 84550 ASSAY OF BLOOD/URIC ACID: CPT | Performed by: FAMILY MEDICINE

## 2025-03-12 PROCEDURE — 80061 LIPID PANEL: CPT | Performed by: FAMILY MEDICINE

## 2025-03-12 PROCEDURE — 36415 COLL VENOUS BLD VENIPUNCTURE: CPT | Performed by: FAMILY MEDICINE

## 2025-03-12 PROCEDURE — 99999 PR PBB SHADOW E&M-EST. PATIENT-LVL III: CPT | Mod: PBBFAC,,, | Performed by: FAMILY MEDICINE

## 2025-03-12 PROCEDURE — 80053 COMPREHEN METABOLIC PANEL: CPT | Performed by: FAMILY MEDICINE

## 2025-03-12 PROCEDURE — 84153 ASSAY OF PSA TOTAL: CPT | Performed by: FAMILY MEDICINE

## 2025-03-12 RX ORDER — HYDROCHLOROTHIAZIDE 25 MG/1
25 TABLET ORAL DAILY
Qty: 100 TABLET | Refills: 3 | Status: SHIPPED | OUTPATIENT
Start: 2025-03-12

## 2025-03-12 RX ORDER — ROSUVASTATIN CALCIUM 20 MG/1
20 TABLET, COATED ORAL NIGHTLY
Qty: 100 TABLET | Refills: 3 | Status: SHIPPED | OUTPATIENT
Start: 2025-03-12

## 2025-03-12 RX ORDER — AMLODIPINE AND VALSARTAN 10; 320 MG/1; MG/1
1 TABLET ORAL DAILY
Qty: 100 TABLET | Refills: 3 | Status: SHIPPED | OUTPATIENT
Start: 2025-03-12

## 2025-03-12 RX ORDER — OMEPRAZOLE 20 MG/1
20 CAPSULE, DELAYED RELEASE ORAL DAILY
Qty: 100 CAPSULE | Refills: 3 | Status: SHIPPED | OUTPATIENT
Start: 2025-03-12

## 2025-03-12 RX ORDER — ALLOPURINOL 100 MG/1
100 TABLET ORAL DAILY
Qty: 100 TABLET | Refills: 3 | Status: SHIPPED | OUTPATIENT
Start: 2025-03-12

## 2025-03-12 NOTE — ASSESSMENT & PLAN NOTE
Lab Results   Component Value Date    EGFRNORACEVR 51.7 (A) 03/12/2024    EGFRNORACEVR 56.5 (A) 06/08/2023    CREATININE 1.5 (H) 03/12/2024    CREATININE 1.4 06/08/2023     TREATMENT PLAN: Ensure adequate water intake. Avoid/minimize NSAID use. Avoid nephrotoxic drugs as able. Monitor and control BP. Periodic lab monitoring of renal function.

## 2025-03-12 NOTE — PROGRESS NOTES
OFFICE VISIT 3/12/25  8:40 AM CDT    CHIEF COMPLAINT: Annual Exam    Primary hypertension: He has chronic primary hypertension, currently managed with amlodipine-valsartan ( mg) and hydroCHLOROthiazide (25 mg). Today's /88. He says that home BP readings are better than reflected in [facility] program. The treatment plan includes continuing current medications, monitoring blood pressure, and periodic lab monitoring of renal function. Lifestyle measures such as a low-sodium diet, regular physical activity, and weight management are recommended.    Chronic kidney disease, stage 3a: He has stage 3a chronic kidney disease, with recent eGFR values of 51.7 and 56.5, and creatinine levels of 1.5 and 1.4. The treatment plan includes ensuring adequate water intake, avoiding NSAIDs and nephrotoxic drugs, monitoring blood pressure, and periodic lab monitoring of renal function. Current medications include amlodipine-valsartan and hydroCHLOROthiazide. Additional orders include a comprehensive metabolic panel and microalbumin/creatinine ratio.    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 Recent uric acid levels were 8.1 and 8.3. The treatment plan includes continuing allopurinoL, monitoring uric acid levels, and ensuring adequate hydration. Dietary modifications to reduce purine intake are also recommended.    Mixed hyperlipidemia: He has mixed hyperlipidemia, managed with rosuvastatin (20 mg). Recent lab results show cholesterol at 148, triglycerides at 177, and HDL at 38. The treatment plan includes continuing rosuvastatin, ordering a lipid panel, and recommending lifestyle modifications such as a low-fat diet and regular exercise.    Gastroesophageal reflux disease without esophagitis: He has GERD, managed with omeprazole (20 mg). The treatment plan includes continuing omeprazole and recommending lifestyle  modifications such as avoiding trigger foods, eating smaller meals, and not lying down immediately after eating.    Screening PSA: He is undergoing routine PSA screening, with recent results showing PSA levels of 2.2 and 2.1. The treatment plan includes continuing with regular PSA screenings as ordered.  1. Primary hypertension  -     amlodipine-valsartan (EXFORGE)  mg per tablet; Take 1 tablet by mouth once daily.  Dispense: 100 tablet; Refill: 3  -     hydroCHLOROthiazide (HYDRODIURIL) 25 MG tablet; Take 1 tablet (25 mg total) by mouth once daily.  Dispense: 100 tablet; Refill: 3  -     Comprehensive Metabolic Panel; Standing  -     Microalbumin/Creatinine Ratio, Urine; Standing    2. Chronic idiopathic gout involving toe of left foot without tophus  -     allopurinoL (ZYLOPRIM) 100 MG tablet; Take 1 tablet (100 mg total) by mouth once daily.  Dispense: 100 tablet; Refill: 3  -     URIC ACID; Standing    3. Chronic kidney disease, stage 3a  Assessment & Plan:  Lab Results   Component Value Date    EGFRNORACEVR 51.7 (A) 03/12/2024    EGFRNORACEVR 56.5 (A) 06/08/2023    CREATININE 1.5 (H) 03/12/2024    CREATININE 1.4 06/08/2023     TREATMENT PLAN: Ensure adequate water intake. Avoid/minimize NSAID use. Avoid nephrotoxic drugs as able. Monitor and control BP. Periodic lab monitoring of renal function.     Orders:  -     amlodipine-valsartan (EXFORGE)  mg per tablet; Take 1 tablet by mouth once daily.  Dispense: 100 tablet; Refill: 3  -     Comprehensive Metabolic Panel; Standing  -     Microalbumin/Creatinine Ratio, Urine; Standing    4. Gastroesophageal reflux disease without esophagitis  -     omeprazole (PRILOSEC) 20 MG capsule; Take 1 capsule (20 mg total) by mouth once daily.  Dispense: 100 capsule; Refill: 3    5. Mixed hyperlipidemia  -     rosuvastatin (CRESTOR) 20 MG tablet; Take 1 tablet (20 mg total) by mouth every evening.  Dispense: 100 tablet; Refill: 3  -     Lipid Panel; Standing  -      "Comprehensive Metabolic Panel; Standing    6. Screening PSA (prostate specific antigen)  -     PSA, Screening; Standing         Unless specified otherwise, chronic conditions are represented as and appear to be compensated/controlled and stable.  Today's visit involved the intricate management of episodic problem(s) and the ongoing care for the patient's serious or complex condition(s) listed above, reflecting the inherent complexity of providing longitudinal, comprehensive evaluation and management as the central hub for the patient's primary care services.    Except as noted herein, ROS is otherwise negative.    Vitals:    03/12/25 0836   BP: 132/88   BP Location: Left arm   Patient Position: Sitting   Pulse: 88   SpO2: 95%   Weight: 95.5 kg (210 lb 8.6 oz)   Height: 5' 11" (1.803 m)   Physical Exam  Vitals reviewed.   Constitutional:       General: He is not in acute distress.     Appearance: Normal appearance.   Eyes:      General: No scleral icterus.     Conjunctiva/sclera: Conjunctivae normal.   Neck:      Vascular: No carotid bruit.   Cardiovascular:      Rate and Rhythm: Normal rate and regular rhythm.      Heart sounds: Normal heart sounds.   Pulmonary:      Effort: Pulmonary effort is normal. No respiratory distress.      Breath sounds: Normal breath sounds. No wheezing or rhonchi.   Abdominal:      General: Bowel sounds are normal. There is no distension.      Palpations: Abdomen is soft. There is no mass.      Tenderness: There is no abdominal tenderness.   Lymphadenopathy:      Cervical: No cervical adenopathy.   Skin:     General: Skin is warm and dry.      Coloration: Skin is not jaundiced.   Neurological:      General: No focal deficit present.      Mental Status: He is alert and oriented to person, place, and time.   Psychiatric:         Mood and Affect: Mood normal.         Behavior: Behavior normal.         Judgment: Judgment normal.     Documentation entered by me for this encounter may have been " "done in part using speech-recognition technology. Although I have made an effort to ensure accuracy, "sound like" errors may exist and should be interpreted in context.    Future Appointments   Date Time Provider Department [place]   3/12/2025  9:50 AM SPECIMEN, [facility] [place]   3/3/2026  7:00 AM SPECIMEN, [facility] [place]   3/3/2026  7:30 AM LABORATORY, [facility]   3/10/2026  9:40 AM KIMMY Haile MD [facility]     "

## 2025-03-24 DIAGNOSIS — Z00.00 ENCOUNTER FOR MEDICARE ANNUAL WELLNESS EXAM: ICD-10-CM

## 2025-04-16 ENCOUNTER — RESULTS FOLLOW-UP (OUTPATIENT)
Dept: INTERNAL MEDICINE | Facility: CLINIC | Age: 65
End: 2025-04-16

## 2025-04-16 PROBLEM — R80.9 MICROALBUMINURIA: Status: ACTIVE | Noted: 2025-04-16

## 2025-05-05 ENCOUNTER — OFFICE VISIT (OUTPATIENT)
Dept: URGENT CARE | Facility: CLINIC | Age: 65
End: 2025-05-05
Payer: MEDICARE

## 2025-05-05 VITALS
TEMPERATURE: 98 F | OXYGEN SATURATION: 96 % | HEART RATE: 85 BPM | SYSTOLIC BLOOD PRESSURE: 146 MMHG | DIASTOLIC BLOOD PRESSURE: 87 MMHG | RESPIRATION RATE: 18 BRPM | HEIGHT: 71 IN | WEIGHT: 205 LBS | BODY MASS INDEX: 28.7 KG/M2

## 2025-05-05 DIAGNOSIS — Z97.3 WEARS GLASSES: ICD-10-CM

## 2025-05-05 DIAGNOSIS — H10.9 BACTERIAL CONJUNCTIVITIS OF BOTH EYES: Primary | ICD-10-CM

## 2025-05-05 DIAGNOSIS — H57.89 EYE DISCHARGE: ICD-10-CM

## 2025-05-05 DIAGNOSIS — R03.0 ELEVATED BLOOD PRESSURE READING: ICD-10-CM

## 2025-05-05 DIAGNOSIS — H53.8 BLURRED VISION, BILATERAL: ICD-10-CM

## 2025-05-05 DIAGNOSIS — H57.89 EYE REDNESS: ICD-10-CM

## 2025-05-05 DIAGNOSIS — H57.13 DISCOMFORT OF BOTH EYES: ICD-10-CM

## 2025-05-05 DIAGNOSIS — H53.143 PHOTOPHOBIA OF BOTH EYES: ICD-10-CM

## 2025-05-05 DIAGNOSIS — B96.89 BACTERIAL CONJUNCTIVITIS OF BOTH EYES: Primary | ICD-10-CM

## 2025-05-05 PROCEDURE — 99214 OFFICE O/P EST MOD 30 MIN: CPT | Mod: S$GLB,,, | Performed by: PHYSICIAN ASSISTANT

## 2025-05-05 RX ORDER — ERYTHROMYCIN 5 MG/G
OINTMENT OPHTHALMIC 3 TIMES DAILY
Qty: 3.5 G | Refills: 0 | Status: SHIPPED | OUTPATIENT
Start: 2025-05-05 | End: 2025-05-12

## 2025-05-05 NOTE — PATIENT INSTRUCTIONS
CONJUNCTIVITIS/PINK EYE:      Use the antibiotic eye medication as directed.     You should avoid touching your eyes and wash your hands to avoid spreading the infection.    Clean cool compresses to the eye may be comforting    Wear your glasses and avoid wearing contacts until the infection is better (at least 1-2 weeks from now).    Change/ wash bedding.     Wear sunglasses on kassidy days.     If your symptoms do not improve, you will need to follow-up with ophthalmology or your PCP.      ELEVATED BLOOD PRESSURE READING:    - Your blood pressure was noted to be elevated in clinic today.-- please keep an eye on blood pressures.  - Record blood pressures and follow up with primary care doctor if blood pressures continue to be elevated.  - Here are a few generalized recommended lifestyle modifications proven to lower BPs--DASH diet, exercise, weight loss, reducing stress.  *Of note: above recommendations are generalized conservative lifestyle modifications that include but are not limited to above list.   Please do not attempt any of the above recommendations if they do not pertain to you or should cause overall detriment to your health.   Please call the clinic or your PCP with any questions you may have in regards to BP and lifestyle modifications.        If you have been discharged from the clinic prior to your point of care test results being completed, please make sure to check your [employer] account.  If there is a change in treatment, we will communicate with you through here.  If your test is positive, and medications are ordered, these will be sent to your preferred pharmacy.   If your test is negative, no further steps needed. If you do not hear from us or have questions, please call the clinic.      - You must understand that you have received an Urgent Care treatment only and that you may be released before all of your medical problems are known or treated.   - You, the patient, will arrange for follow up  care as instructed with your primary care provider or recommended specialist.   - If your condition worsens or fails to improve we recommend that you receive another evaluation at the ER immediately or contact your PCP to discuss your concerns, or return here.   - Please do not drive or make any important decisions for 24 hours if you have received any pain medications, sedatives or mood altering drugs during your visit.    Disclaimer: This document was drafted with the use of a voice recognition device and is likely to have sound alike errors.

## 2025-05-05 NOTE — PROGRESS NOTES
"Subjective:      Patient ID: Darek Leal is a 65 y.o. male.    Vitals:  height is 5' 11" (1.803 m) and weight is 93 kg (205 lb). His oral temperature is 98.1 °F (36.7 °C). His blood pressure is 146/87 (abnormal) and his pulse is 85. His respiration is 18 and oxygen saturation is 96%.     Chief Complaint: Eye Problem    Pt reports bilateral watery eyes x 6 days. Pt reports foreign body sensation and redness x 4 days. Pt reports some discharge in the morning x 3 days. Denies any injury to eyes.     Eye Problem   Both eyes are affected. This is a new problem. The current episode started in the past 7 days. The problem occurs constantly. The problem has been gradually worsening. There was no injury mechanism. The pain is at a severity of 0/10. He Does not wear contacts. Associated symptoms include blurred vision, an eye discharge, eye redness, a foreign body sensation and photophobia. Pertinent negatives include no itching. Treatments tried: visine. The treatment provided mild relief.       Eyes:  Positive for eye discharge, eye redness, photophobia and blurred vision. Negative for eye itching.      Objective:     Vitals:    05/05/25 1357 05/05/25 1400   BP: (!) 144/93 (!) 146/87   BP Location: Left arm Left arm   Patient Position: Sitting Sitting   Pulse: 85    Resp: 18    Temp: 98.1 °F (36.7 °C)    TempSrc: Oral    SpO2: 96%    Weight: 93 kg (205 lb)    Height: 5' 11" (1.803 m)        Physical Exam   Constitutional: He is oriented to person, place, and time. He appears well-developed.   HENT:   Head: Normocephalic and atraumatic.   Ears:   Right Ear: External ear normal.   Left Ear: External ear normal.   Nose: Nose normal.   Mouth/Throat: Oropharynx is clear and moist.   Eyes: Right eye visual fields normal and left eye visual fields normal. EOM and lids are normal. Pupils are equal, round, and reactive to light. Right eye exhibits discharge and exudate. Left eye exhibits discharge and exudate. Right conjunctiva " is injected. Right conjunctiva has no hemorrhage. Left conjunctiva is injected. Left conjunctiva has no hemorrhage. Pupils are equal. Extraocular movement intact   Neck: Trachea normal and phonation normal. Neck supple.   Musculoskeletal: Normal range of motion.         General: Normal range of motion.   Neurological: He is alert and oriented to person, place, and time.   Skin: Skin is warm, dry and intact.   Psychiatric: His speech is normal and behavior is normal. Judgment and thought content normal.   Nursing note and vitals reviewed.      Assessment:     1. Bacterial conjunctivitis of both eyes    2. Eye redness    3. Photophobia of both eyes    4. Eye discharge    5. Discomfort of both eyes    6. Blurred vision, bilateral    7. Wears glasses    8. Elevated blood pressure reading      Vision Screening    Right eye Left eye Both eyes   Without correction      With correction 20/40 20/50 20/40       Plan:       Bacterial conjunctivitis of both eyes  -     erythromycin (ROMYCIN) ophthalmic ointment; Place into both eyes 3 (three) times daily. PLEASE APPLY TO THE AFFECTED EYE(S).  PLEASE REFER TO OINTMENT APPLICATION INSTRUCTIONS FOUND IN YOUR AFTER VISIT SUMMARY. for 7 days  Dispense: 3.5 g; Refill: 0    Eye redness    Photophobia of both eyes    Eye discharge    Discomfort of both eyes    Blurred vision, bilateral    Wears glasses    Elevated blood pressure reading          Medical Decision Making:   Initial Assessment:   VSS   Here with wife   Wearing glasses   Urgent Care Management:  See entire note for further details    Discussed with pt all pertinent  information and results. Discussed pt dx and plan of tx.   Gave pt all f/u and return to the  instructions. All questions and concerns were addressed at this time.   Pt expresses understanding of information and instructions, and is comfortable with plan to discharge.   Pt is stable for discharge.     I discussed with patient and/or family/caretaker that  evaluation in the UC does not suggest any emergent or life threatening medical conditions requiring immediate intervention beyond what was provided in the UC, and I believe patient is safe for discharge.  Regardless, an unremarkable evaluation in the UC does not preclude the development or presence of a serious or life threatening condition. As such, patient was instructed to GO to ER immediately for any worsening or change in current symptoms.               Patient Instructions   CONJUNCTIVITIS/PINK EYE:      Use the antibiotic eye medication as directed.     You should avoid touching your eyes and wash your hands to avoid spreading the infection.    Clean cool compresses to the eye may be comforting    Wear your glasses and avoid wearing contacts until the infection is better (at least 1-2 weeks from now).    Change/ wash bedding.     Wear sunglasses on kassidy days.     If your symptoms do not improve, you will need to follow-up with ophthalmology or your PCP.      ELEVATED BLOOD PRESSURE READING:    - Your blood pressure was noted to be elevated in clinic today.-- please keep an eye on blood pressures.  - Record blood pressures and follow up with primary care doctor if blood pressures continue to be elevated.  - Here are a few generalized recommended lifestyle modifications proven to lower BPs--DASH diet, exercise, weight loss, reducing stress.  *Of note: above recommendations are generalized conservative lifestyle modifications that include but are not limited to above list.   Please do not attempt any of the above recommendations if they do not pertain to you or should cause overall detriment to your health.   Please call the clinic or your PCP with any questions you may have in regards to BP and lifestyle modifications.        If you have been discharged from the clinic prior to your point of care test results being completed, please make sure to check your [employer] account.  If there is a change in treatment,  we will communicate with you through here.  If your test is positive, and medications are ordered, these will be sent to your preferred pharmacy.   If your test is negative, no further steps needed. If you do not hear from us or have questions, please call the clinic.      - You must understand that you have received an Urgent Care treatment only and that you may be released before all of your medical problems are known or treated.   - You, the patient, will arrange for follow up care as instructed with your primary care provider or recommended specialist.   - If your condition worsens or fails to improve we recommend that you receive another evaluation at the ER immediately or contact your PCP to discuss your concerns, or return here.   - Please do not drive or make any important decisions for 24 hours if you have received any pain medications, sedatives or mood altering drugs during your visit.    Disclaimer: This document was drafted with the use of a voice recognition device and is likely to have sound alike errors.